# Patient Record
Sex: FEMALE | Race: WHITE | NOT HISPANIC OR LATINO | URBAN - METROPOLITAN AREA
[De-identification: names, ages, dates, MRNs, and addresses within clinical notes are randomized per-mention and may not be internally consistent; named-entity substitution may affect disease eponyms.]

---

## 2017-12-02 ENCOUNTER — EMERGENCY (EMERGENCY)
Facility: HOSPITAL | Age: 82
LOS: 1 days | Discharge: HOME | End: 2017-12-02
Attending: EMERGENCY MEDICINE | Admitting: EMERGENCY MEDICINE

## 2017-12-02 VITALS
TEMPERATURE: 98 F | DIASTOLIC BLOOD PRESSURE: 85 MMHG | RESPIRATION RATE: 18 BRPM | OXYGEN SATURATION: 100 % | SYSTOLIC BLOOD PRESSURE: 132 MMHG | HEART RATE: 92 BPM | WEIGHT: 104.94 LBS | HEIGHT: 59 IN

## 2017-12-02 NOTE — ED ADULT TRIAGE NOTE - CHIEF COMPLAINT QUOTE
Pt had glue placed to cover incision site after having a dermatologic procedure on Wednesday, today began having bleeding from incision site, pt on Eliquis

## 2017-12-02 NOTE — ED ADULT NURSE REASSESSMENT NOTE - NS ED NURSE REASSESS COMMENT FT1
Pt states her doctor called her back and told her to go to his office. States she does not want to be seen in ED

## 2017-12-06 DIAGNOSIS — L76.21 POSTPROCEDURAL HEMORRHAGE OF SKIN AND SUBCUTANEOUS TISSUE FOLLOWING A DERMATOLOGIC PROCEDURE: ICD-10-CM

## 2017-12-08 NOTE — ED POST DISCHARGE NOTE - OTHER COMMUNICATION
There is no contact information listed for the patient to check on her status and her reason for leaving the Er

## 2021-08-07 ENCOUNTER — TRANSCRIPTION ENCOUNTER (OUTPATIENT)
Age: 86
End: 2021-08-07

## 2023-08-21 PROBLEM — Z85.038 HISTORY OF MALIGNANT NEOPLASM OF COLON: Status: RESOLVED | Noted: 2023-08-21 | Resolved: 2023-08-21

## 2023-08-21 PROBLEM — Z00.00 ENCOUNTER FOR PREVENTIVE HEALTH EXAMINATION: Status: ACTIVE | Noted: 2023-08-21

## 2023-08-21 PROBLEM — M81.8 OTHER OSTEOPOROSIS: Status: ACTIVE | Noted: 2023-08-21

## 2023-08-21 RX ORDER — DAPAGLIFLOZIN 10 MG/1
10 TABLET, FILM COATED ORAL DAILY
Qty: 90 | Refills: 3 | Status: ACTIVE | COMMUNITY

## 2023-08-21 RX ORDER — DENOSUMAB 60 MG/ML
60 INJECTION SUBCUTANEOUS
Refills: 0 | Status: ACTIVE | COMMUNITY

## 2023-08-22 ENCOUNTER — APPOINTMENT (OUTPATIENT)
Dept: HEART AND VASCULAR | Facility: CLINIC | Age: 88
End: 2023-08-22
Payer: MEDICARE

## 2023-08-22 ENCOUNTER — APPOINTMENT (OUTPATIENT)
Dept: HEART AND VASCULAR | Facility: CLINIC | Age: 88
End: 2023-08-22

## 2023-08-22 VITALS
TEMPERATURE: 97.7 F | DIASTOLIC BLOOD PRESSURE: 77 MMHG | HEART RATE: 66 BPM | BODY MASS INDEX: 19.38 KG/M2 | HEIGHT: 56 IN | SYSTOLIC BLOOD PRESSURE: 129 MMHG | WEIGHT: 86.13 LBS | OXYGEN SATURATION: 94 %

## 2023-08-22 DIAGNOSIS — M81.8 OTHER OSTEOPOROSIS W/OUT CURRENT PATHOLOGICAL FRACTURE: ICD-10-CM

## 2023-08-22 DIAGNOSIS — Z63.4 DISAPPEARANCE AND DEATH OF FAMILY MEMBER: ICD-10-CM

## 2023-08-22 DIAGNOSIS — Z78.9 OTHER SPECIFIED HEALTH STATUS: ICD-10-CM

## 2023-08-22 DIAGNOSIS — Z85.038 PERSONAL HISTORY OF OTHER MALIGNANT NEOPLASM OF LARGE INTESTINE: ICD-10-CM

## 2023-08-22 PROCEDURE — 99205 OFFICE O/P NEW HI 60 MIN: CPT | Mod: 25

## 2023-08-22 PROCEDURE — 93000 ELECTROCARDIOGRAM COMPLETE: CPT

## 2023-08-22 RX ORDER — IRBESARTAN 150 MG/1
150 TABLET, FILM COATED ORAL
Refills: 0 | Status: ACTIVE | COMMUNITY

## 2023-08-22 SDOH — SOCIAL STABILITY - SOCIAL INSECURITY: DISSAPEARANCE AND DEATH OF FAMILY MEMBER: Z63.4

## 2023-08-22 NOTE — PHYSICAL EXAM
[No Acute Distress] : no acute distress [Normal Conjunctiva] : normal conjunctiva [Normal Venous Pressure] : normal venous pressure [No Carotid Bruit] : no carotid bruit [No Murmur] : no murmur [No Rub] : no rub [No Gallop] : no gallop [Clear Lung Fields] : clear lung fields [Good Air Entry] : good air entry [No Respiratory Distress] : no respiratory distress  [Soft] : abdomen soft [Non Tender] : non-tender [No Masses/organomegaly] : no masses/organomegaly [Normal Bowel Sounds] : normal bowel sounds [Normal Gait] : normal gait [No Cyanosis] : no cyanosis [No Clubbing] : no clubbing [No Varicosities] : no varicosities [Edema ___] : edema [unfilled] [No Rash] : no rash [No Skin Lesions] : no skin lesions [Moves all extremities] : moves all extremities [No Focal Deficits] : no focal deficits [Normal Speech] : normal speech [Alert and Oriented] : alert and oriented [Normal memory] : normal memory [Frail] : frail [de-identified] : + irregularly irregular  [de-identified] : uses a walker

## 2023-08-22 NOTE — REVIEW OF SYSTEMS
[Negative] : Heme/Lymph [Dyspnea on exertion] : dyspnea during exertion [Lower Ext Edema] : lower extremity edema [Joint Pain] : joint pain

## 2023-08-22 NOTE — ASSESSMENT
[FreeTextEntry1] : ======================================================================================= 1. Atrial fibrillation, persistent:  [id number] Score 5:       - continue systemic anticoagulation with Eliquis 2.5 mg po bid (age > 80, body weight < 60 kg)       - discussed with patient avoidance of ASA and NSAIDS as well as need for bleeding surveillance        - will follow with periodic echocardiograms to assess LV function (ordered today)   2. Chronic diastolic heart failure: NYHA II:       - continue Farxiga 10mg po qd  (discussed importance of urinary hygiene to prevent perineal infections)       - continue furosemide 40mg po qd prn       - will follow with serial echocardiograms (ordered today)   3. HTN: BP at ACC/AHA 2017 guideline target:       - continue irbesartan 150mg po qd       - continue metoprolol succinate 25mg po qd  4. Dyslipidemia:       - continue atorvastatin 10mg po qd      - discussed therapeutic lifestyle changes to promote improved lipid metabolism       - will check lab work next visit    I spent >45 minutes of total time on this visit, including time spent face-to-face and non-face-to-face.  During this time, I took a relevant history and examined the patient.  I reviewed relevant portions of the medical record and formulated a differential diagnosis and plan.  I explained the relevant cardiac diagnoses to the patient, as well as the work up and management plan.  I answered all questions related to the patient's cardiac conditions.

## 2023-08-22 NOTE — HISTORY OF PRESENT ILLNESS
[FreeTextEntry1] : Ms. Merino presents for initial evaluation and management of persistent atrial fibrillation, chronic diastolic heart failure, osteoporosis, chronic anemia, colon cancer (10/13/16), HTN, and dyslipidemia.  She was previously followed by another cardiologist, Daksha Nru MD (who moved).  Her  (who was my patient) passed away on 12/24/22 at the age of 89.  She is coping relatively well since his passing.  She is able to walk several blocks at a time without difficulty.  She has mild lower extremity edema and wears compression socks regularly.  She denies chest pain or palpitations.   1. I was told the name of the physician that took care of my child while in the hospital.    2. I have been told about any important findings on my child's physical exam and my child's plan of care.    3. The doctor clearly explained my child's diagnosis and other possible diagnoses that were considered.    4. My child's doctor explained all the tests that were done and their results (if available). I understand that some of the test results may not be ready before we go home and I was told how I can get these results. I understand that a summary of my child's hospitalization and important test results will be shared with my child's outpatient doctor.    5. My child's doctor talked to me about what I need to do when we go home.    6. I understand what signs and symptoms to watch for. I understand what symptoms I would need to call my doctor for and/or return to the hospital.    7. I have the phone number to call the hospital for results and/or questions after I leave the hospital.

## 2023-08-22 NOTE — REASON FOR VISIT
[Other: ____] : [unfilled] [FreeTextEntry1] : ======================================================================= Diagnostic Tests: -------------------------------------------------------------- EC23: atrial fibrillation with SVR, LAD, possible old anteroseptal MI.  23: atrial fibrillation with SVR, LAD, possible old anteroseptal MI.  --------------------------------------------------------------- Echo: 22: EF 70%, small LV, severe biatrial dilation, aortic sclerosis, mild AI, mild MR, moderate TR.   ---------------------------------------------------------------- Monitors: 2017: 48-hour Holter: HR , average 83bpm, APC's with aberrant conduction. ----------------------------------------------------------------- Stress tests:  16:  MPI: negative regadenoson ECG, adequate hemodynamic response, freq PVC's, normal perfusion (small mild anterior defect due to breast attenuation), EF 75%.

## 2023-09-22 RX ORDER — METOPROLOL SUCCINATE 25 MG/1
25 TABLET, EXTENDED RELEASE ORAL DAILY
Qty: 90 | Refills: 3 | Status: ACTIVE | COMMUNITY
Start: 1900-01-01 | End: 1900-01-01

## 2023-09-22 RX ORDER — APIXABAN 2.5 MG/1
2.5 TABLET, FILM COATED ORAL
Qty: 180 | Refills: 3 | Status: ACTIVE | COMMUNITY
Start: 1900-01-01 | End: 1900-01-01

## 2023-09-27 RX ORDER — ATORVASTATIN CALCIUM 10 MG/1
10 TABLET, FILM COATED ORAL DAILY
Qty: 90 | Refills: 3 | Status: ACTIVE | COMMUNITY
Start: 1900-01-01 | End: 1900-01-01

## 2023-10-09 ENCOUNTER — EMERGENCY (EMERGENCY)
Facility: HOSPITAL | Age: 88
LOS: 1 days | Discharge: HOME | End: 2023-10-09
Attending: EMERGENCY MEDICINE | Admitting: EMERGENCY MEDICINE
Payer: MEDICARE

## 2023-10-09 VITALS
DIASTOLIC BLOOD PRESSURE: 84 MMHG | RESPIRATION RATE: 16 BRPM | OXYGEN SATURATION: 96 % | TEMPERATURE: 98 F | SYSTOLIC BLOOD PRESSURE: 150 MMHG | HEART RATE: 68 BPM | WEIGHT: 89.07 LBS | HEIGHT: 66 IN

## 2023-10-09 VITALS
OXYGEN SATURATION: 96 % | SYSTOLIC BLOOD PRESSURE: 148 MMHG | RESPIRATION RATE: 16 BRPM | HEART RATE: 70 BPM | DIASTOLIC BLOOD PRESSURE: 84 MMHG

## 2023-10-09 DIAGNOSIS — Z79.01 LONG TERM (CURRENT) USE OF ANTICOAGULANTS: ICD-10-CM

## 2023-10-09 DIAGNOSIS — M79.662 PAIN IN LEFT LOWER LEG: ICD-10-CM
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
## 2023-10-09 DIAGNOSIS — I48.91 UNSPECIFIED ATRIAL FIBRILLATION: ICD-10-CM

## 2023-10-09 DIAGNOSIS — S80.12XA CONTUSION OF LEFT LOWER LEG, INITIAL ENCOUNTER: ICD-10-CM
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
## 2023-10-09 PROCEDURE — 73590 X-RAY EXAM OF LOWER LEG: CPT | Mod: 26,LT

## 2023-10-09 PROCEDURE — 99284 EMERGENCY DEPT VISIT MOD MDM: CPT

## 2023-10-09 NOTE — ED PROVIDER NOTE - CLINICAL SUMMARY MEDICAL DECISION MAKING FREE TEXT BOX
89 y/o F with Hx of A-fib on Eliquis presents for evaluation of contusion to left lower leg after possibly kicking the edge of her bed accidentally. On exam patient is afebrile. Vital signs stable. Patient has contusion to anterior aspect of left lower leg. No calf swelling or tenderness to suggest DVT. No signs of cellulitis on exam. Suspect isolated anterior hematoma. Plan for x-ray to evaluate for bony injury and reassess.

## 2023-10-09 NOTE — ED PROVIDER NOTE - PHYSICAL EXAMINATION
VITAL SIGNS: I have reviewed nursing notes and confirm.  CONST: No apparent distress.  ENT: No nasal discharge; airway clear.  EYES: Sclera clear. Pupils round and symmetrical bilaterally.  RESP: Breathing comfortably; speaking in full sentences.   MSK: +Tender contusion to anterior left lower leg, no calf swelling or TTP. No erythema or induration of skin around contusion.   NEURO: Alert, oriented. Speech is fluent and appropriate. Moving all extremities appropriately. No gross motor or sensory abnormalities.  SKIN: Skin is normal temperature; no diaphoresis; no pallor.  PSYCH: Cooperative. Appropriate mood, language, and behavior.

## 2023-10-09 NOTE — ED PROVIDER NOTE - OBJECTIVE STATEMENT
88 y/o F with Hx of A-fib on Eliquis presents for left lower leg bruise since yesterday. Patient's aide is at bedside who noticed the bruise this morning and brought her to the ED. Patient states she may have hit her leg against her bed during the weekend but unclear when. She reports she wears compression stockings daily.

## 2023-10-09 NOTE — ED PROVIDER NOTE - PROGRESS NOTE DETAILS
XRay neg for fx.  Pt ambulatory without limp.  LLE neurovascularly intact.  Compartments soft.  Discussed home care with pt including elevating leg above heart, ice, and compression.  Stable for dc.

## 2023-10-09 NOTE — ED PROVIDER NOTE - NSFOLLOWUPINSTRUCTIONS_ED_ALL_ED_FT
Apply ice to the area and keep the leg elevated above the level of the heart to help the bruise resolve as quickly as possible.        Contusion  A contusion is a deep bruise. Contusions are the result of a blunt injury to tissues and muscle fibers under the skin. The injury causes bleeding under the skin. The skin over the contusion may turn blue, purple, or yellow. Minor injuries will give you a painless contusion, but more severe injuries cause contusions that can stay painful and swollen for a few weeks.    Follow these instructions at home:  Pay attention to any changes in your symptoms. Let your health care provider know about them. Take these actions to relieve your pain.    Managing pain, stiffness, and swelling    Bag of ice on a towel on the skin.  Use resting, icing, applying pressure (compression), and raising (elevating) the injured area. This is often called the RICE method.  Rest the injured area. Return to your normal activities as told by your health care provider. Ask your health care provider what activities are safe for you.  If directed, put ice on the injured area. To do this:  Put ice in a plastic bag.  Place a towel between your skin and the bag.  Leave the ice on for 20 minutes, 2–3 times a day.  If your skin turns bright red, remove the ice right away to prevent skin damage. The risk of skin damage is higher if you cannot feel pain, heat, or cold.  If directed, apply light compression to the injured area using an elastic bandage. Make sure the bandage is not wrapped too tightly. Remove and reapply the bandage as directed by your health care provider.  If possible, elevate the injured area above the level of your heart while you are sitting or lying down.  General instructions    Take over-the-counter and prescription medicines only as told by your health care provider.  Keep all follow-up visits. Your health care provider may want to see how your contusion is healing with treatment.  Contact a health care provider if:  Your symptoms do not improve after several days of treatment.  Your symptoms get worse.  You have difficulty moving the injured area.  Get help right away if:  You have severe pain.  You have numbness in a hand or foot.  Your hand or foot turns pale or cold.  This information is not intended to replace advice given to you by your health care provider. Make sure you discuss any questions you have with your health care provider.

## 2023-10-09 NOTE — ED PROVIDER NOTE - PATIENT PORTAL LINK FT
You can access the FollowMyHealth Patient Portal offered by [facility] by registering at the following website: [email or web address]. By joining [employer]’s FollowMyHealth portal, you will also be able to view your health information using other applications (apps) compatible with our system.

## 2023-10-09 NOTE — ED ADULT NURSE NOTE - NSFALLUNIVINTERV_ED_ALL_ED
Bed/Stretcher in lowest position, wheels locked, appropriate side rails in place/Call bell, personal items and telephone in reach/Instruct patient to call for assistance before getting out of bed/chair/stretcher/Non-slip footwear applied when patient is off stretcher/[place] to call system/Physically safe environment - no spills, clutter or unnecessary equipment/Purposeful proactive rounding/Room/bathroom lighting operational, light cord in reach

## 2023-12-12 ENCOUNTER — INPATIENT (INPATIENT)
Facility: HOSPITAL | Age: 88
LOS: 1 days | Discharge: HOME | DRG: 291 | End: 2023-12-14
Attending: INTERNAL MEDICINE | Admitting: INTERNAL MEDICINE
Payer: COMMERCIAL

## 2023-12-12 ENCOUNTER — APPOINTMENT (OUTPATIENT)
Dept: HEART AND VASCULAR | Facility: CLINIC | Age: 88
End: 2023-12-12

## 2023-12-12 ENCOUNTER — NON-APPOINTMENT (OUTPATIENT)
Age: 88
End: 2023-12-12

## 2023-12-12 ENCOUNTER — EMERGENCY (EMERGENCY)
Facility: HOSPITAL | Age: 88
LOS: 1 days | Discharge: ANOTHER HOSPITAL | End: 2023-12-12
Attending: EMERGENCY MEDICINE | Admitting: EMERGENCY MEDICINE
Payer: MEDICARE

## 2023-12-12 VITALS
DIASTOLIC BLOOD PRESSURE: 74 MMHG | SYSTOLIC BLOOD PRESSURE: 158 MMHG | RESPIRATION RATE: 20 BRPM | TEMPERATURE: 98 F | HEART RATE: 79 BPM | HEIGHT: 55 IN | WEIGHT: 89.95 LBS | OXYGEN SATURATION: 97 %

## 2023-12-12 VITALS
SYSTOLIC BLOOD PRESSURE: 112 MMHG | OXYGEN SATURATION: 97 % | DIASTOLIC BLOOD PRESSURE: 64 MMHG | TEMPERATURE: 98 F | RESPIRATION RATE: 16 BRPM | HEART RATE: 58 BPM | HEIGHT: 55 IN

## 2023-12-12 VITALS
DIASTOLIC BLOOD PRESSURE: 54 MMHG | SYSTOLIC BLOOD PRESSURE: 90 MMHG | RESPIRATION RATE: 18 BRPM | OXYGEN SATURATION: 97 % | HEART RATE: 62 BPM

## 2023-12-12 DIAGNOSIS — I11.0 HYPERTENSIVE HEART DISEASE WITH HEART FAILURE: ICD-10-CM

## 2023-12-12 DIAGNOSIS — I50.33 ACUTE ON CHRONIC DIASTOLIC (CONGESTIVE) HEART FAILURE: ICD-10-CM

## 2023-12-12 DIAGNOSIS — C44.90 UNSPECIFIED MALIGNANT NEOPLASM OF SKIN, UNSPECIFIED: ICD-10-CM

## 2023-12-12 DIAGNOSIS — E78.5 HYPERLIPIDEMIA, UNSPECIFIED: ICD-10-CM

## 2023-12-12 DIAGNOSIS — Z79.01 LONG TERM (CURRENT) USE OF ANTICOAGULANTS: ICD-10-CM

## 2023-12-12 DIAGNOSIS — I48.91 UNSPECIFIED ATRIAL FIBRILLATION: ICD-10-CM

## 2023-12-12 DIAGNOSIS — R07.89 OTHER CHEST PAIN: ICD-10-CM

## 2023-12-12 DIAGNOSIS — Z90.49 ACQUIRED ABSENCE OF OTHER SPECIFIED PARTS OF DIGESTIVE TRACT: Chronic | ICD-10-CM

## 2023-12-12 DIAGNOSIS — I10 ESSENTIAL (PRIMARY) HYPERTENSION: ICD-10-CM

## 2023-12-12 DIAGNOSIS — I50.31 ACUTE DIASTOLIC (CONGESTIVE) HEART FAILURE: ICD-10-CM

## 2023-12-12 LAB
ALBUMIN SERPL ELPH-MCNC: 3.5 G/DL — SIGNIFICANT CHANGE UP (ref 3.4–5)
ALBUMIN SERPL ELPH-MCNC: 3.5 G/DL — SIGNIFICANT CHANGE UP (ref 3.4–5)
ALP SERPL-CCNC: 95 U/L — SIGNIFICANT CHANGE UP (ref 40–120)
ALP SERPL-CCNC: 95 U/L — SIGNIFICANT CHANGE UP (ref 40–120)
ALT FLD-CCNC: 29 U/L — SIGNIFICANT CHANGE UP (ref 12–42)
ALT FLD-CCNC: 29 U/L — SIGNIFICANT CHANGE UP (ref 12–42)
ANION GAP SERPL CALC-SCNC: 6 MMOL/L — LOW (ref 9–16)
ANION GAP SERPL CALC-SCNC: 6 MMOL/L — LOW (ref 9–16)
AST SERPL-CCNC: 31 U/L — SIGNIFICANT CHANGE UP (ref 15–37)
AST SERPL-CCNC: 31 U/L — SIGNIFICANT CHANGE UP (ref 15–37)
BASOPHILS # BLD AUTO: 0.02 K/UL — SIGNIFICANT CHANGE UP (ref 0–0.2)
BASOPHILS # BLD AUTO: 0.02 K/UL — SIGNIFICANT CHANGE UP (ref 0–0.2)
BASOPHILS NFR BLD AUTO: 0.3 % — SIGNIFICANT CHANGE UP (ref 0–2)
BASOPHILS NFR BLD AUTO: 0.3 % — SIGNIFICANT CHANGE UP (ref 0–2)
BILIRUB SERPL-MCNC: 0.8 MG/DL — SIGNIFICANT CHANGE UP (ref 0.2–1.2)
BILIRUB SERPL-MCNC: 0.8 MG/DL — SIGNIFICANT CHANGE UP (ref 0.2–1.2)
BUN SERPL-MCNC: 25 MG/DL — HIGH (ref 7–23)
BUN SERPL-MCNC: 25 MG/DL — HIGH (ref 7–23)
CALCIUM SERPL-MCNC: 9.3 MG/DL — SIGNIFICANT CHANGE UP (ref 8.5–10.5)
CALCIUM SERPL-MCNC: 9.3 MG/DL — SIGNIFICANT CHANGE UP (ref 8.5–10.5)
CHLORIDE SERPL-SCNC: 103 MMOL/L — SIGNIFICANT CHANGE UP (ref 96–108)
CHLORIDE SERPL-SCNC: 103 MMOL/L — SIGNIFICANT CHANGE UP (ref 96–108)
CK MB CFR SERPL CALC: 3.4 NG/ML — SIGNIFICANT CHANGE UP (ref 0–6.7)
CK MB CFR SERPL CALC: 3.4 NG/ML — SIGNIFICANT CHANGE UP (ref 0–6.7)
CK SERPL-CCNC: 49 U/L — SIGNIFICANT CHANGE UP (ref 25–170)
CK SERPL-CCNC: 49 U/L — SIGNIFICANT CHANGE UP (ref 25–170)
CO2 SERPL-SCNC: 31 MMOL/L — SIGNIFICANT CHANGE UP (ref 22–31)
CO2 SERPL-SCNC: 31 MMOL/L — SIGNIFICANT CHANGE UP (ref 22–31)
CREAT SERPL-MCNC: 0.76 MG/DL — SIGNIFICANT CHANGE UP (ref 0.5–1.3)
CREAT SERPL-MCNC: 0.76 MG/DL — SIGNIFICANT CHANGE UP (ref 0.5–1.3)
EGFR: 75 ML/MIN/1.73M2 — SIGNIFICANT CHANGE UP
EGFR: 75 ML/MIN/1.73M2 — SIGNIFICANT CHANGE UP
EOSINOPHIL # BLD AUTO: 0.03 K/UL — SIGNIFICANT CHANGE UP (ref 0–0.5)
EOSINOPHIL # BLD AUTO: 0.03 K/UL — SIGNIFICANT CHANGE UP (ref 0–0.5)
EOSINOPHIL NFR BLD AUTO: 0.4 % — SIGNIFICANT CHANGE UP (ref 0–6)
EOSINOPHIL NFR BLD AUTO: 0.4 % — SIGNIFICANT CHANGE UP (ref 0–6)
FLUAV AG NPH QL: SIGNIFICANT CHANGE UP
FLUAV AG NPH QL: SIGNIFICANT CHANGE UP
FLUBV AG NPH QL: SIGNIFICANT CHANGE UP
FLUBV AG NPH QL: SIGNIFICANT CHANGE UP
GLUCOSE SERPL-MCNC: 106 MG/DL — HIGH (ref 70–99)
GLUCOSE SERPL-MCNC: 106 MG/DL — HIGH (ref 70–99)
HCT VFR BLD CALC: 40.6 % — SIGNIFICANT CHANGE UP (ref 34.5–45)
HCT VFR BLD CALC: 40.6 % — SIGNIFICANT CHANGE UP (ref 34.5–45)
HGB BLD-MCNC: 13.3 G/DL — SIGNIFICANT CHANGE UP (ref 11.5–15.5)
HGB BLD-MCNC: 13.3 G/DL — SIGNIFICANT CHANGE UP (ref 11.5–15.5)
IMM GRANULOCYTES NFR BLD AUTO: 0.4 % — SIGNIFICANT CHANGE UP (ref 0–0.9)
IMM GRANULOCYTES NFR BLD AUTO: 0.4 % — SIGNIFICANT CHANGE UP (ref 0–0.9)
LACTATE BLDV-MCNC: 0.9 MMOL/L — SIGNIFICANT CHANGE UP (ref 0.5–2)
LACTATE BLDV-MCNC: 0.9 MMOL/L — SIGNIFICANT CHANGE UP (ref 0.5–2)
LYMPHOCYTES # BLD AUTO: 0.95 K/UL — LOW (ref 1–3.3)
LYMPHOCYTES # BLD AUTO: 0.95 K/UL — LOW (ref 1–3.3)
LYMPHOCYTES # BLD AUTO: 13.3 % — SIGNIFICANT CHANGE UP (ref 13–44)
LYMPHOCYTES # BLD AUTO: 13.3 % — SIGNIFICANT CHANGE UP (ref 13–44)
MCHC RBC-ENTMCNC: 30.9 PG — SIGNIFICANT CHANGE UP (ref 27–34)
MCHC RBC-ENTMCNC: 30.9 PG — SIGNIFICANT CHANGE UP (ref 27–34)
MCHC RBC-ENTMCNC: 32.8 GM/DL — SIGNIFICANT CHANGE UP (ref 32–36)
MCHC RBC-ENTMCNC: 32.8 GM/DL — SIGNIFICANT CHANGE UP (ref 32–36)
MCV RBC AUTO: 94.2 FL — SIGNIFICANT CHANGE UP (ref 80–100)
MCV RBC AUTO: 94.2 FL — SIGNIFICANT CHANGE UP (ref 80–100)
MONOCYTES # BLD AUTO: 0.53 K/UL — SIGNIFICANT CHANGE UP (ref 0–0.9)
MONOCYTES # BLD AUTO: 0.53 K/UL — SIGNIFICANT CHANGE UP (ref 0–0.9)
MONOCYTES NFR BLD AUTO: 7.4 % — SIGNIFICANT CHANGE UP (ref 2–14)
MONOCYTES NFR BLD AUTO: 7.4 % — SIGNIFICANT CHANGE UP (ref 2–14)
NEUTROPHILS # BLD AUTO: 5.6 K/UL — SIGNIFICANT CHANGE UP (ref 1.8–7.4)
NEUTROPHILS # BLD AUTO: 5.6 K/UL — SIGNIFICANT CHANGE UP (ref 1.8–7.4)
NEUTROPHILS NFR BLD AUTO: 78.2 % — HIGH (ref 43–77)
NEUTROPHILS NFR BLD AUTO: 78.2 % — HIGH (ref 43–77)
NRBC # BLD: 0 /100 WBCS — SIGNIFICANT CHANGE UP (ref 0–0)
NRBC # BLD: 0 /100 WBCS — SIGNIFICANT CHANGE UP (ref 0–0)
NT-PROBNP SERPL-SCNC: 2480 PG/ML — HIGH
NT-PROBNP SERPL-SCNC: 2480 PG/ML — HIGH
PCO2 BLDV: 44 MMHG — HIGH (ref 39–42)
PCO2 BLDV: 44 MMHG — HIGH (ref 39–42)
PH BLDV: 7.43 — SIGNIFICANT CHANGE UP (ref 7.32–7.43)
PH BLDV: 7.43 — SIGNIFICANT CHANGE UP (ref 7.32–7.43)
PLATELET # BLD AUTO: 183 K/UL — SIGNIFICANT CHANGE UP (ref 150–400)
PLATELET # BLD AUTO: 183 K/UL — SIGNIFICANT CHANGE UP (ref 150–400)
PO2 BLDV: 45 MMHG — SIGNIFICANT CHANGE UP (ref 25–45)
PO2 BLDV: 45 MMHG — SIGNIFICANT CHANGE UP (ref 25–45)
POTASSIUM SERPL-MCNC: 3.8 MMOL/L — SIGNIFICANT CHANGE UP (ref 3.5–5.3)
POTASSIUM SERPL-MCNC: 3.8 MMOL/L — SIGNIFICANT CHANGE UP (ref 3.5–5.3)
POTASSIUM SERPL-SCNC: 3.8 MMOL/L — SIGNIFICANT CHANGE UP (ref 3.5–5.3)
POTASSIUM SERPL-SCNC: 3.8 MMOL/L — SIGNIFICANT CHANGE UP (ref 3.5–5.3)
PROT SERPL-MCNC: 7.1 G/DL — SIGNIFICANT CHANGE UP (ref 6.4–8.2)
PROT SERPL-MCNC: 7.1 G/DL — SIGNIFICANT CHANGE UP (ref 6.4–8.2)
RBC # BLD: 4.31 M/UL — SIGNIFICANT CHANGE UP (ref 3.8–5.2)
RBC # BLD: 4.31 M/UL — SIGNIFICANT CHANGE UP (ref 3.8–5.2)
RBC # FLD: 14.2 % — SIGNIFICANT CHANGE UP (ref 10.3–14.5)
RBC # FLD: 14.2 % — SIGNIFICANT CHANGE UP (ref 10.3–14.5)
RSV RNA NPH QL NAA+NON-PROBE: SIGNIFICANT CHANGE UP
RSV RNA NPH QL NAA+NON-PROBE: SIGNIFICANT CHANGE UP
SAO2 % BLDV: 70.1 % — SIGNIFICANT CHANGE UP (ref 67–88)
SAO2 % BLDV: 70.1 % — SIGNIFICANT CHANGE UP (ref 67–88)
SARS-COV-2 RNA SPEC QL NAA+PROBE: SIGNIFICANT CHANGE UP
SARS-COV-2 RNA SPEC QL NAA+PROBE: SIGNIFICANT CHANGE UP
SODIUM SERPL-SCNC: 140 MMOL/L — SIGNIFICANT CHANGE UP (ref 132–145)
SODIUM SERPL-SCNC: 140 MMOL/L — SIGNIFICANT CHANGE UP (ref 132–145)
TROPONIN I, HIGH SENSITIVITY RESULT: 12.4 NG/L — SIGNIFICANT CHANGE UP
TROPONIN I, HIGH SENSITIVITY RESULT: 12.4 NG/L — SIGNIFICANT CHANGE UP
TROPONIN I, HIGH SENSITIVITY RESULT: 15 NG/L — SIGNIFICANT CHANGE UP
TROPONIN I, HIGH SENSITIVITY RESULT: 15 NG/L — SIGNIFICANT CHANGE UP
TROPONIN T, HIGH SENSITIVITY RESULT: 32 NG/L — SIGNIFICANT CHANGE UP (ref 0–51)
TROPONIN T, HIGH SENSITIVITY RESULT: 32 NG/L — SIGNIFICANT CHANGE UP (ref 0–51)
WBC # BLD: 7.16 K/UL — SIGNIFICANT CHANGE UP (ref 3.8–10.5)
WBC # BLD: 7.16 K/UL — SIGNIFICANT CHANGE UP (ref 3.8–10.5)
WBC # FLD AUTO: 7.16 K/UL — SIGNIFICANT CHANGE UP (ref 3.8–10.5)
WBC # FLD AUTO: 7.16 K/UL — SIGNIFICANT CHANGE UP (ref 3.8–10.5)

## 2023-12-12 PROCEDURE — 99223 1ST HOSP IP/OBS HIGH 75: CPT

## 2023-12-12 PROCEDURE — 99285 EMERGENCY DEPT VISIT HI MDM: CPT

## 2023-12-12 PROCEDURE — 71045 X-RAY EXAM CHEST 1 VIEW: CPT | Mod: 26

## 2023-12-12 RX ORDER — LOSARTAN POTASSIUM 100 MG/1
25 TABLET, FILM COATED ORAL DAILY
Refills: 0 | Status: DISCONTINUED | OUTPATIENT
Start: 2023-12-13 | End: 2023-12-14

## 2023-12-12 RX ORDER — FUROSEMIDE 40 MG
40 TABLET ORAL
Refills: 0 | Status: DISCONTINUED | OUTPATIENT
Start: 2023-12-13 | End: 2023-12-14

## 2023-12-12 RX ORDER — DAPAGLIFLOZIN 10 MG/1
1 TABLET, FILM COATED ORAL
Refills: 0 | DISCHARGE

## 2023-12-12 RX ORDER — ATORVASTATIN CALCIUM 80 MG/1
1 TABLET, FILM COATED ORAL
Refills: 0 | DISCHARGE

## 2023-12-12 RX ORDER — CALCIUM CARBONATE 500(1250)
2 TABLET ORAL
Refills: 0 | DISCHARGE

## 2023-12-12 RX ORDER — IRBESARTAN 75 MG/1
1 TABLET ORAL
Refills: 0 | DISCHARGE

## 2023-12-12 RX ORDER — APIXABAN 2.5 MG/1
2.5 TABLET, FILM COATED ORAL
Refills: 0 | Status: DISCONTINUED | OUTPATIENT
Start: 2023-12-13 | End: 2023-12-14

## 2023-12-12 RX ORDER — METOPROLOL TARTRATE 50 MG
25 TABLET ORAL DAILY
Refills: 0 | Status: DISCONTINUED | OUTPATIENT
Start: 2023-12-13 | End: 2023-12-14

## 2023-12-12 RX ORDER — APIXABAN 2.5 MG/1
2.5 TABLET, FILM COATED ORAL
Refills: 0 | Status: DISCONTINUED | OUTPATIENT
Start: 2023-12-12 | End: 2023-12-12

## 2023-12-12 RX ORDER — APIXABAN 2.5 MG/1
1 TABLET, FILM COATED ORAL
Refills: 0 | DISCHARGE

## 2023-12-12 RX ORDER — CALCIUM CARBONATE 500(1250)
1 TABLET ORAL DAILY
Refills: 0 | Status: DISCONTINUED | OUTPATIENT
Start: 2023-12-13 | End: 2023-12-14

## 2023-12-12 RX ORDER — DAPAGLIFLOZIN 10 MG/1
10 TABLET, FILM COATED ORAL DAILY
Refills: 0 | Status: DISCONTINUED | OUTPATIENT
Start: 2023-12-13 | End: 2023-12-14

## 2023-12-12 RX ORDER — NITROGLYCERIN 6.5 MG
0.4 CAPSULE, EXTENDED RELEASE ORAL ONCE
Refills: 0 | Status: COMPLETED | OUTPATIENT
Start: 2023-12-12 | End: 2023-12-12

## 2023-12-12 RX ORDER — METOPROLOL TARTRATE 50 MG
1 TABLET ORAL
Refills: 0 | DISCHARGE

## 2023-12-12 RX ORDER — CHOLECALCIFEROL (VITAMIN D3) 125 MCG
1 CAPSULE ORAL
Refills: 0 | DISCHARGE

## 2023-12-12 RX ORDER — FUROSEMIDE 40 MG
60 TABLET ORAL ONCE
Refills: 0 | Status: COMPLETED | OUTPATIENT
Start: 2023-12-12 | End: 2023-12-12

## 2023-12-12 RX ORDER — ATORVASTATIN CALCIUM 80 MG/1
10 TABLET, FILM COATED ORAL AT BEDTIME
Refills: 0 | Status: DISCONTINUED | OUTPATIENT
Start: 2023-12-12 | End: 2023-12-14

## 2023-12-12 RX ORDER — CHOLECALCIFEROL (VITAMIN D3) 125 MCG
2000 CAPSULE ORAL
Refills: 0 | Status: DISCONTINUED | OUTPATIENT
Start: 2023-12-12 | End: 2023-12-14

## 2023-12-12 RX ORDER — FUROSEMIDE 40 MG
20 TABLET ORAL ONCE
Refills: 0 | Status: COMPLETED | OUTPATIENT
Start: 2023-12-12 | End: 2023-12-12

## 2023-12-12 RX ADMIN — ATORVASTATIN CALCIUM 10 MILLIGRAM(S): 80 TABLET, FILM COATED ORAL at 23:48

## 2023-12-12 RX ADMIN — Medication 20 MILLIGRAM(S): at 23:48

## 2023-12-12 RX ADMIN — Medication 0.4 MILLIGRAM(S): at 06:42

## 2023-12-12 RX ADMIN — Medication 60 MILLIGRAM(S): at 06:45

## 2023-12-12 NOTE — H&P ADULT - HISTORY OF PRESENT ILLNESS
INCOMPLETE    88 yo F with PMHx of A-fib on Eliquis, CHF, unknown EF, skin CA, walked in accompanied by family member c/o SOB and chest pressure x 2d. Pt reports having increased SOB/WOB in the past two days with chest pressure and pain since this morning. Has been taking all her home meds as directed and has appt with her Cardiologist Dr. Finkelstein at 2pm today for outpt TTE. Denies fever, chills, palpitations, diaphoresis, cough, hemoptysis, wheezing, worsening peripheral edema, focal weakness, numbness, tingling, paresthesia, HA, dizziness, neck pain, N/V/D/C, abdominal pain, change in urinary/bowel function, trauma, fall, rash, and malaise. Generally ambulates with a rolling walker.     At [facility], /74 mmHg, HR 79, T 97.5 F, SpO2 97% on RA.   Labs significant for Trop I 12.4 > 15, BNP 2480, RVP negative.   EKG rate controlled afib, non ischemic. CXR w mild pulm vasc congestion, R sided effusion, cardiomegaly.  Upon reassessment, pt noted to be hypoxic to 92% on RA, given lasix 60mg IV x1, sl NTG x1 and placed on BIPAP for inc WOB. Subsequently waned to 2L NC.   En route to [facility], per EMS pt in afib rate 45-60 w BP 92/51, pt divered to ED for initial assessment.     On arrival to [facility] ED, VS /64, HR afib 66bpm, SpO2 94% on RA, RR 18, T 97.6 F. Pt asymptoamtic, ___CP free. Pt admitted to cardiac tele for further management of ____.             4'6"  89.9 lbs INCOMPLETE    89 yo F with PMHx of A-fib on Eliquis, CHF, unknown EF, skin CA, walked in accompanied by family member c/o SOB and chest pressure x 2d. Pt reports having increased SOB/WOB in the past two days with chest pressure and pain since this morning. Has been taking all her home meds as directed and has appt with her Cardiologist Dr. Finkelstein at 2pm today for outpt TTE. Denies fever, chills, palpitations, diaphoresis, cough, hemoptysis, wheezing, worsening peripheral edema, focal weakness, numbness, tingling, paresthesia, HA, dizziness, neck pain, N/V/D/C, abdominal pain, change in urinary/bowel function, trauma, fall, rash, and malaise. Generally ambulates with a rolling walker.     At [facility], /74 mmHg, HR 79, T 97.5 F, SpO2 97% on RA.   Labs significant for Trop I 12.4 > 15, BNP 2480, RVP negative.   EKG rate controlled afib, non ischemic. CXR w mild pulm vasc congestion, R sided effusion, cardiomegaly.  Upon reassessment, pt noted to be hypoxic to 92% on RA, given lasix 60mg IV x1, sl NTG x1 and placed on BIPAP for inc WOB. Subsequently waned to 2L NC.   En route to [facility], per EMS pt in afib rate 45-60 w BP 92/51, pt divered to ED for initial assessment.     On arrival to [facility] ED, VS /64, HR afib 66bpm, SpO2 94% on RA, RR 18, T 97.6 F. Pt asymptoamtic, ___CP free. Pt admitted to cardiac tele for further management of ____.             4'6"  89.9 lbs INCOMPLETE    88 yo F with PMHx of [place] (on Eliquis), HFpEF, HTN, HLD, osteoporosis, anemia, colon cancer (10/13/16 s/p partial colectomy), and skin CA who presented to [facility] complaining of worsening SOB x2 days with new chest pressure onset this morning described as ___. She endorses compliance with all her medications. Pt had an appt with her cardiologist Dr. Finkelstein today for outpt TTE however entered the ED 2/2 worsening sx. She Denies fever, chills, palpitations, diaphoresis, cough, hemoptysis, wheezing, worsening peripheral edema, focal weakness, numbness, tingling, paresthesia, HA, dizziness, neck pain, N/V/D/C, abdominal pain, change in urinary/bowel function, trauma, fall, rash, and malaise. Generally ambulates with a rolling walker.     [facility] course:  Vital signs: /74 mmHg, HR 79, T 97.5 F, SpO2 97% on RA.   Labs significant for Trop I 12.4 > 15, BNP 2480, RVP negative.   EKG rate controlled afib, non ischemic. CXR w mild pulm vasc congestion, R sided effusion, cardiomegaly.  --Upon reassessment, pt noted to be hypoxic to 92% on RA, given Lasix 60mg IV x1, sl NTG x1 and placed on BIPAP for inc WOB. Subsequently weaned to 2L NC.     En route to [facility], per EMS pt in afib rate 45-60 w BP 92/51, pt diverted to ED for initial assessment.     On arrival to [facility] ED, VS /64, HR afib 66bpm, SpO2 94% on RA, RR 18, T 97.6 F. Pt asymptomatic ___CP free. Pt admitted to cardiac tele for further management of HFpEF exacerbation.  INCOMPLETE    89 yo F with PMHx of [place] (on Eliquis), HFpEF, HTN, HLD, osteoporosis, anemia, colon cancer (10/13/16 s/p partial colectomy), and skin CA who presented to [facility] complaining of worsening SOB x2 days with new chest pressure onset this morning described as ___. She endorses compliance with all her medications. Pt had an appt with her cardiologist Dr. Finkelstein today for outpt TTE however entered the ED 2/2 worsening sx. She Denies fever, chills, palpitations, diaphoresis, cough, hemoptysis, wheezing, worsening peripheral edema, focal weakness, numbness, tingling, paresthesia, HA, dizziness, neck pain, N/V/D/C, abdominal pain, change in urinary/bowel function, trauma, fall, rash, and malaise. Generally ambulates with a rolling walker.     [facility] course:  Vital signs: /74 mmHg, HR 79, T 97.5 F, SpO2 97% on RA.   Labs significant for Trop I 12.4 > 15, BNP 2480, RVP negative.   EKG rate controlled afib, non ischemic. CXR w mild pulm vasc congestion, R sided effusion, cardiomegaly.  --Upon reassessment, pt noted to be hypoxic to 92% on RA, given Lasix 60mg IV x1, sl NTG x1 and placed on BIPAP for inc WOB. Subsequently weaned to 2L NC.     En route to [facility], per EMS pt in afib rate 45-60 w BP 92/51, pt diverted to ED for initial assessment.     On arrival to [facility] ED, VS /64, HR afib 66bpm, SpO2 94% on RA, RR 18, T 97.6 F. Pt asymptomatic ___CP free. Pt admitted to cardiac tele for further management of HFpEF exacerbation.  89 yo F with PMHx of A.fib (on Eliquis), HFpEF, HTN, HLD, osteoporosis, anemia, colon cancer (s/p partial colectomy 10/13/16 ), and squamous cell skin carcinoma s/p multiple Moh's procedures who presented to [facility] accompanied by home caretaker complaining of worsening SOB/DOEx2 days with new localized substernal chest pressure onset this morning described as a transient intermittent sharp pain that resolves on its own. She further admits to chronic b/l LE edema at baseline. Pt had an appt with her cardiologist Dr. Finkelstein today for outpt TTE however entered the ED 2/2 worsening sx.  She endorses compliance with all her medications and denies any prior similar episodes, however does note that she was hospitalized 1 year ago for RSV and was discharged on home O2 but returned it a few months afterwards as she didn't require it. Denies any associated orthopnea, PND, palpitations, cough, diaphoresis, fever, chills, n/v/d, sick contacts, abd pain. Generally ambulates with a rolling walker.     [facility] course:  Vital signs: /74 mmHg, HR 79, T 97.5 F, SpO2 97% on RA.   Labs significant for Trop I 12.4 > 15, BNP 2480, RVP negative.   EKG rate controlled afib non ischemic. CXR w mild pulm vasc congestion, R sided effusion, cardiomegaly.  --Upon reassessment, pt noted to be hypoxic to 92% on RA, given Lasix 60mg IV x1, sl NTG x1 and placed on BIPAP for inc WOB. Subsequently weaned to 2L NC.     En route to [facility], per EMS pt in afib rate 45-60 w BP 92/51 thus pt diverted to ED for initial assessment.     On arrival to [facility] ED, VS /64, HR a.fib 66bpm, SpO2 94% on RA, RR 18, T 97.6 F. Trop T 32, CK/CKMB normal. Pt asymptomatic, SOB has somewhat improved, CP free. EKG remains rate controlled afib, non ischemic. Pt admitted to cardiac tele for further management of HFpEF exacerbation.  88 yo F with PMHx of A.fib (on Eliquis), HFpEF, HTN, HLD, osteoporosis, anemia, colon cancer (s/p partial colectomy 10/13/16 ), and squamous cell skin carcinoma s/p multiple Moh's procedures who presented to [facility] accompanied by home caretaker complaining of worsening SOB/DOEx2 days with new localized substernal chest pressure onset this morning described as a transient intermittent sharp pain that resolves on its own. She further admits to chronic b/l LE edema at baseline. Pt had an appt with her cardiologist Dr. Finkelstein today for outpt TTE however entered the ED 2/2 worsening sx.  She endorses compliance with all her medications and denies any prior similar episodes, however does note that she was hospitalized 1 year ago for RSV and was discharged on home O2 but returned it a few months afterwards as she didn't require it. Denies any associated orthopnea, PND, palpitations, cough, diaphoresis, fever, chills, n/v/d, sick contacts, abd pain. Generally ambulates with a rolling walker.     [facility] course:  Vital signs: /74 mmHg, HR 79, T 97.5 F, SpO2 97% on RA.   Labs significant for Trop I 12.4 > 15, BNP 2480, RVP negative.   EKG rate controlled afib non ischemic. CXR w mild pulm vasc congestion, R sided effusion, cardiomegaly.  --Upon reassessment, pt noted to be hypoxic to 92% on RA, given Lasix 60mg IV x1, sl NTG x1 and placed on BIPAP for inc WOB. Subsequently weaned to 2L NC.     En route to [facility], per EMS pt in afib rate 45-60 w BP 92/51 thus pt diverted to ED for initial assessment.     On arrival to [facility] ED, VS /64, HR a.fib 66bpm, SpO2 94% on RA, RR 18, T 97.6 F. Trop T 32, CK/CKMB normal. Pt asymptomatic, SOB has somewhat improved, CP free. EKG remains rate controlled afib, non ischemic. Pt admitted to cardiac tele for further management of HFpEF exacerbation.  88 yo F with PMHx of A.fib (on Eliquis), HFpEF, HTN, HLD, osteoporosis, anemia, colon cancer (s/p partial colectomy 10/13/16 ), and SCC s/p multiple Moh's procedures who presented to [facility] accompanied by home caretaker complaining of worsening SOB/DOEx2 days with new localized substernal chest pressure onset this morning described as a transient intermittent sharp pain that resolves on its own. She further admits to chronic b/l LE edema at baseline. Pt had an appt with her cardiologist Dr. Finkelstein today for outpt TTE however entered the ED 2/2 worsening sx.  She endorses compliance with all her medications and denies any prior similar episodes, however does note that she was hospitalized 1 year ago for RSV and was discharged on home O2 but returned it a few months afterwards as she didn't require it. Denies any associated orthopnea, PND, palpitations, cough, diaphoresis, fever, chills, n/v/d, sick contacts, abd pain. Generally ambulates with a rolling walker.     [facility] course:  Vital signs: /74 mmHg, HR 79, T 97.5 F, SpO2 97% on RA.   Labs significant for Trop I 12.4 > 15, BNP 2480, RVP negative.   EKG rate controlled afib non ischemic. CXR w mild pulm vasc congestion, R sided effusion, cardiomegaly.  --Upon reassessment, pt noted to be hypoxic to 92% on RA, given Lasix 60mg IV x1, sl NTG x1 and placed on BIPAP for inc WOB. Subsequently weaned to 2L NC.     En route to [facility], per EMS pt in afib rate 45-60 w BP 92/51 thus pt diverted to ED for initial assessment.     On arrival to [facility] ED, VS /64, HR a.fib 66bpm, SpO2 94% on RA, RR 18, T 97.6 F. Trop T 32, CK/CKMB normal. Pt asymptomatic, SOB has somewhat improved, CP free. EKG remains rate controlled afib, non ischemic. Pt admitted to cardiac tele for further management of HFpEF exacerbation.  89 yo F with PMHx of A.fib (on Eliquis), HFpEF, HTN, HLD, osteoporosis, anemia, colon cancer (s/p partial colectomy 10/13/16 ), and SCC s/p multiple Moh's procedures who presented to [facility] accompanied by home caretaker complaining of worsening SOB/DOEx2 days with new localized substernal chest pressure onset this morning described as a transient intermittent sharp pain that resolves on its own. She further admits to chronic b/l LE edema at baseline. Pt had an appt with her cardiologist Dr. Finkelstein today for outpt TTE however entered the ED 2/2 worsening sx.  She endorses compliance with all her medications and denies any prior similar episodes, however does note that she was hospitalized 1 year ago for RSV and was discharged on home O2 but returned it a few months afterwards as she didn't require it. Denies any associated orthopnea, PND, palpitations, cough, diaphoresis, fever, chills, n/v/d, sick contacts, abd pain. Generally ambulates with a rolling walker.     [facility] course:  Vital signs: /74 mmHg, HR 79, T 97.5 F, SpO2 97% on RA.   Labs significant for Trop I 12.4 > 15, BNP 2480, RVP negative.   EKG rate controlled afib non ischemic. CXR w mild pulm vasc congestion, R sided effusion, cardiomegaly.  --Upon reassessment, pt noted to be hypoxic to 92% on RA, given Lasix 60mg IV x1, sl NTG x1 and placed on BIPAP for inc WOB. Subsequently weaned to 2L NC.     En route to [facility], per EMS pt in afib rate 45-60 w BP 92/51 thus pt diverted to ED for initial assessment.     On arrival to [facility] ED, VS /64, HR a.fib 66bpm, SpO2 94% on RA, RR 18, T 97.6 F. Trop T 32, CK/CKMB normal. Pt asymptomatic, SOB has somewhat improved, CP free. EKG remains rate controlled afib, non ischemic. Pt admitted to cardiac tele for further management of HFpEF exacerbation.  88 yo F, former smoker, with PMHx of A.fib (on Eliquis), HFpEF, HTN, HLD, osteoporosis, anemia, colon cancer (s/p partial colectomy 10/13/16 ), and SCC s/p multiple Moh's procedures who presented to [facility] accompanied by home caretaker complaining of worsening SOB/DOEx2 days with new localized substernal chest pressure onset this morning described as a transient intermittent sharp pain that resolves on its own. She further admits to chronic b/l LE edema at baseline. Pt had an appt with her cardiologist Dr. Finkelstein today for outpt TTE however entered the ED 2/2 worsening sx.  She endorses compliance with all her medications and denies any prior similar episodes, however does note that she was hospitalized 1 year ago for RSV and was discharged on home O2 but returned it a few months afterwards as she didn't require it. Denies any associated orthopnea, PND, palpitations, cough, diaphoresis, fever, chills, n/v/d, sick contacts, abd pain. Generally ambulates with a rolling walker.     [facility] course:  Vital signs: /74 mmHg, HR 79, T 97.5 F, SpO2 97% on RA.   Labs significant for Trop I 12.4 > 15, BNP 2480, RVP negative.   EKG rate controlled afib non ischemic. CXR w mild pulm vasc congestion, R sided effusion, cardiomegaly.  --Upon reassessment, pt noted to be hypoxic to 92% on RA, given Lasix 60mg IV x1, sl NTG x1 and placed on BIPAP for inc WOB. Subsequently weaned to 2L NC.     En route to [facility], per EMS pt in afib rate 45-60 w BP 92/51 thus pt diverted to ED for initial assessment.     On arrival to [facility] ED, VS /64, HR a.fib 66bpm, SpO2 94% on RA, RR 18, T 97.6 F. Trop T 32, CK/CKMB normal. Pt asymptomatic, SOB has somewhat improved, CP free. EKG remains rate controlled afib, non ischemic. Pt admitted to cardiac tele for further management of HFpEF exacerbation.      89 yo F, former smoker, with PMHx of A.fib (on Eliquis), HFpEF, HTN, HLD, osteoporosis, anemia, colon cancer (s/p partial colectomy 10/13/16 ), and SCC s/p multiple Moh's procedures who presented to [facility] accompanied by home caretaker complaining of worsening SOB/DOEx2 days with new localized substernal chest pressure onset this morning described as a transient intermittent sharp pain that resolves on its own. She further admits to chronic b/l LE edema at baseline. Pt had an appt with her cardiologist Dr. Finkelstein today for outpt TTE however entered the ED 2/2 worsening sx.  She endorses compliance with all her medications and denies any prior similar episodes, however does note that she was hospitalized 1 year ago for RSV and was discharged on home O2 but returned it a few months afterwards as she didn't require it. Denies any associated orthopnea, PND, palpitations, cough, diaphoresis, fever, chills, n/v/d, sick contacts, abd pain. Generally ambulates with a rolling walker.     [facility] course:  Vital signs: /74 mmHg, HR 79, T 97.5 F, SpO2 97% on RA.   Labs significant for Trop I 12.4 > 15, BNP 2480, RVP negative.   EKG rate controlled afib non ischemic. CXR w mild pulm vasc congestion, R sided effusion, cardiomegaly.  --Upon reassessment, pt noted to be hypoxic to 92% on RA, given Lasix 60mg IV x1, sl NTG x1 and placed on BIPAP for inc WOB. Subsequently weaned to 2L NC.     En route to [facility], per EMS pt in afib rate 45-60 w BP 92/51 thus pt diverted to ED for initial assessment.     On arrival to [facility] ED, VS /64, HR a.fib 66bpm, SpO2 94% on RA, RR 18, T 97.6 F. Trop T 32, CK/CKMB normal. Pt asymptomatic, SOB has somewhat improved, CP free. EKG remains rate controlled afib, non ischemic. Pt admitted to cardiac tele for further management of HFpEF exacerbation.

## 2023-12-12 NOTE — ED PROVIDER NOTE - CLINICAL SUMMARY MEDICAL DECISION MAKING FREE TEXT BOX
88 yo F with PMHx of A-fib on Eliquis, CHF, unknown EF, skin CA, walked in accompanied by family member c/o SOB and increased WOB x 2 days, now with chest pressure and pain since this morning. EKG non ischemic appearing, baseline A.fib w rate controlled. Noted hypoxic to 92% on RA with minimal exertion. CXR with mild pulmonary vascular congestion and R sided effusion, afebrile, placed on bipap for WOB, SL NTG, additional diuretic ordered, r/o infectious and ischemic etiology. Will keep pt in obs for CHF exacerbation/cardiac monitoring and serial labs/tele monitoring. Pt has appt with private cardiologist Dr. Montana at 1:45pm today, will likely reach out to Dr. Montana for prompt and safe dispo later today 89 yo F with PMHx of A-fib on Eliquis, CHF, unknown EF, skin CA, walked in accompanied by family member c/o SOB and increased WOB x 2 days, now with chest pressure and pain since this morning. EKG non ischemic appearing, baseline A.fib w rate controlled. Noted hypoxic to 92% on RA with minimal exertion. CXR with mild pulmonary vascular congestion and R sided effusion, afebrile, placed on bipap for WOB, SL NTG, additional diuretic ordered, r/o infectious and ischemic etiology. Will keep pt in obs for CHF exacerbation/cardiac monitoring and serial labs/tele monitoring. Pt has appt with private cardiologist Dr. Montana at 1:45pm today, will likely reach out to Dr. Montana for prompt and safe dispo later today

## 2023-12-12 NOTE — ED CDU PROVIDER INITIAL DAY NOTE - PROGRESS NOTE DETAILS
tolerating bipap, decreased WOB at bedside and appears more comfortable. s/p IV lasix and SL NTG with good urine outpt. strict I&O and cardiac monitoring in place. Will repeat trop in 3 hours and reach out to pt's private cardiologist - Dr. Montana [phone] for close fu. Pt is scheduled for outpt TTE at 2pm today. Will sign out to day team pending repeat trop, reassessment, and appropriate dispo tolerating bipap, decreased WOB at bedside and appears more comfortable. s/p IV lasix and SL NTG with good urine outpt. strict I&O and cardiac monitoring in place. Will repeat trop in 3 hours and reach out to pt's private cardiologist - Dr. Montana [phone] for close fu. Pt is scheduled for outpt TTE at 2pm today. Will sign out to day team pending repeat trop, reassessment, and appropriate dispo

## 2023-12-12 NOTE — ED ADULT NURSE REASSESSMENT NOTE - NS ED NURSE REASSESS COMMENT FT1
Walk trial performed with pulse oximetry. SpO2 dropped to 85% whilst walking with intermittent chest pain. Pt brought back to bed and placed on cardiac monitor and pulse oximetry. NC at 2 lpm maintained and SpO2 97% while in bed. Walk trial performed with pulse oximetry. SpO2 dropped to 85% whilst walking with intermittent chest pain. Pt brought back to bed and placed on cardiac monitor and pulse oximetry. NC at 2 lpm maintained and SpO2 97% while in bed. JAM Daniels made aware.

## 2023-12-12 NOTE — ED ADULT NURSE NOTE - OBJECTIVE STATEMENT
88 yo F presents to ED from [facility] w/ CHf exacerbation, however HR was 48 so she was sent to the ED. Pt denies symptoms or pain. Alert and oriented, ambulates with walker, on blood thinners, denies recent falls. Pt is on 2L O2 NC, RA at baseline. Pt has hooks catheter that was placed somewhere else, but there is no bag attached to it. 89 yo F presents to ED from [facility] w/ CHf exacerbation, however HR was 48 so she was sent to the ED. Pt denies symptoms or pain. Alert and oriented, ambulates with walker, on blood thinners, denies recent falls. Pt is on 2L O2 NC, RA at baseline. Pt has hooks catheter that was placed somewhere else, but there is no bag attached to it.

## 2023-12-12 NOTE — H&P ADULT - NSICDXPASTMEDICALHX_GEN_ALL_CORE_FT
PAST MEDICAL HISTORY:  Acute CHF     Afib     Anemia     Colon cancer     HLD (hyperlipidemia)     HTN (hypertension)     Osteoporosis     Skin cancer      PAST MEDICAL HISTORY:  Acute CHF     Afib     Anemia     Colon cancer     HLD (hyperlipidemia)     HTN (hypertension)     Osteoporosis     Skin cancer     Squamous cell skin cancer

## 2023-12-12 NOTE — PATIENT PROFILE ADULT - FALL HARM RISK - HARM RISK INTERVENTIONS
Assistance with ambulation/Assistance OOB with selected safe patient handling equipment/Communicate Risk of Fall with Harm to all staff/Discuss with provider need for PT consult/Monitor gait and stability/Provide patient with walking aids - walker, cane, crutches/Reinforce activity limits and safety measures with patient and family/Tailored Fall Risk Interventions/Visual Cue: Yellow wristband and red socks/Bed in lowest position, wheels locked, appropriate side rails in place/Call bell, personal items and telephone in reach/Instruct patient to call for assistance before getting out of bed or chair/Non-slip footwear when patient is out of bed/[place] to call system/Physically safe environment - no spills, clutter or unnecessary equipment/Purposeful Proactive Rounding/Room/bathroom lighting operational, light cord in reach Assistance with ambulation/Assistance OOB with selected safe patient handling equipment/Communicate Risk of Fall with Harm to all staff/Discuss with provider need for PT consult/Monitor gait and stability/Provide patient with walking aids - walker, cane, crutches/Reinforce activity limits and safety measures with patient and family/Tailored Fall Risk Interventions/Visual Cue: Yellow wristband and red socks/Bed in lowest position, wheels locked, appropriate side rails in place/Call bell, personal items and telephone in reach/Instruct patient to call for assistance before getting out of bed or chair/Non-slip footwear when patient is out of bed/[place] to call system/Physically safe environment - no spills, clutter or unnecessary equipment/Purposeful Proactive Rounding/Room/bathroom lighting operational, light cord in reach

## 2023-12-12 NOTE — ED ADULT NURSE REASSESSMENT NOTE - NS ED NURSE REASSESS COMMENT FT1
Pt received from RN Cheikh A&Ox4. Pt not in any obvious respiratory distress. Respirations equal and unlabored and SpO2 at 97%. Pt appears anxious on BiPAP machine and demands to be taken off. Plan of care discussed with JAM Bailey and pt taken off of BiPAP. SpO2 at 95% after taking off BiPAP Pt received from RN Cehikh A&Ox4. Pt not in any obvious respiratory distress. Respirations equal and unlabored and SpO2 at 97%. Pt appears anxious on BiPAP machine and demands to be taken off. Plan of care discussed with JAM Bailey and pt taken off of BiPAP. SpO2 at 95% after taking off BiPAP and SpO2 desats to 92-93% while speaking. Pt placed on NC at 2lpm and JAM Bailey notified. Pt bedding changed. Pt received from RN Cheikh A&Ox4. Pt not in any obvious respiratory distress. Respirations equal and unlabored and SpO2 at 97%. Pt appears anxious on BiPAP machine and demands to be taken off. Plan of care discussed with JAM Bailey and pt taken off of BiPAP. SpO2 at 95% after taking off BiPAP and SpO2 desats to 92-93% while speaking. Pt placed on NC at 2lpm and JAM Bailey notified. Pt bedding changed.

## 2023-12-12 NOTE — ED PROVIDER NOTE - OBJECTIVE STATEMENT
transfer from [facility] for cardiology admission for chf exacerbation. En route, hr reportedly dropped to 45-60 so diverted to ED for eval. Pt reports she currently feels fine. Denies chest pain, sob. Per prior note: "PMHx of A-fib on Eliquis, CHF, unknown EF, skin CA, walked in accompanied by family member c/o SOB and chest pressure x 2d. Pt reports having increased SOB/WOB in the past two days with chest pressure and pain since this morning. Has been taking all her home meds as directed and has appt with her Cardiologist Dr. Finkelstein at 2pm today for outpt TTE. Denies fever, chills, palpitations, diaphoresis, cough, hemoptysis, wheezing, worsening peripheral edema, focal weakness, numbness, tingling, paresthesia, HA, dizziness, neck pain, N/V/D/C, abdominal pain, change in urinary/bowel function, trauma, fall, rash, and malaise. Generally ambulates with a rolling walker" She was treated with lasix 60mg iv and nitro sl x1. Initially on bipap, weaned to 2L nc. transfer from [facility] for cardiology admission for chf exacerbation. En route, hr reportedly dropped to 45-60 so diverted to ED for eval. Pt reports she currently feels fine. Denies chest pain, sob. Per prior note: "PMHx of A-fib on Eliquis, CHF, unknown EF, skin CA, walked in accompanied by family member c/o SOB and chest pressure x 2d. Pt reports having increased SOB/WOB in the past two days with chest pressure and pain since this morning. Has been taking all her home meds as directed and has appt with her Cardiologist Dr. Finkelstein at 2pm today for outpt TTE. Denies fever, chills, palpitations, diaphoresis, cough, hemoptysis, wheezing, worsening peripheral edema, focal weakness, numbness, tingling, paresthesia, HA, dizziness, neck pain, N/V/D/C, abdominal pain, change in urinary/bowel function, trauma, fall, rash, and malaise. Generally ambulates with a rolling walker" She was treated with lasix 60mg iv and nitro sl x1. Initially on bipap, weaned to 2L nc.

## 2023-12-12 NOTE — ED ADULT TRIAGE NOTE - CHIEF COMPLAINT QUOTE
Pt walked into ER with family member c/o chest pressure and SOB x2 days worsening tonight around 2300. Pt denies dizziness, nv, or further associated complaints at triage. PMH Afib, CHF, and Hx of skin cancer.

## 2023-12-12 NOTE — ED PROVIDER NOTE - NSICDXPASTMEDICALHX_GEN_ALL_CORE_FT
PAST MEDICAL HISTORY:  Acute CHF     Afib     Skin cancer      PAST MEDICAL HISTORY:  Acute CHF     Afib     HTN (hypertension)     Skin cancer

## 2023-12-12 NOTE — H&P ADULT - NS ATTEND AMEND GEN_ALL_CORE FT
88 yo F, former smoker, with PMHx of A.fib (on Eliquis), HFpEF, HTN, HLD, osteoporosis, anemia, colon cancer (s/p partial colectomy 10/13/16 ), and SCC s/p multiple Moh's procedures who presented to [facility] accompanied by home caretaker complaining of worsening SOB/DOEx2 days with new localized substernal chest pressure onset this morning described as a transient intermittent sharp pain that resolves on its own. She further admits to chronic b/l LE edema at baseline. Pt had an appt with her cardiologist Dr. Finkelstein today for outpt TTE however entered the ED 2/2 worsening sx.  She endorses compliance with all her medications and denies any prior similar episodes, however does note that she was hospitalized 1 year ago for RSV and was discharged on home O2 but returned it a few months afterwards as she didn't require it. Denies any associated orthopnea, PND, palpitations, cough, diaphoresis, fever, chills, n/v/d, sick contacts, abd pain. Generally ambulates with a rolling walker.     1. HFpEF  Now appears euvolemic after iv diuresis. Echo, case discussed with Dr. Mckeon - if concerns for amyloid, PYP scan. 89 yo F, former smoker, with PMHx of A.fib (on Eliquis), HFpEF, HTN, HLD, osteoporosis, anemia, colon cancer (s/p partial colectomy 10/13/16 ), and SCC s/p multiple Moh's procedures who presented to [facility] accompanied by home caretaker complaining of worsening SOB/DOEx2 days with new localized substernal chest pressure onset this morning described as a transient intermittent sharp pain that resolves on its own. She further admits to chronic b/l LE edema at baseline. Pt had an appt with her cardiologist Dr. Finkelstein today for outpt TTE however entered the ED 2/2 worsening sx.  She endorses compliance with all her medications and denies any prior similar episodes, however does note that she was hospitalized 1 year ago for RSV and was discharged on home O2 but returned it a few months afterwards as she didn't require it. Denies any associated orthopnea, PND, palpitations, cough, diaphoresis, fever, chills, n/v/d, sick contacts, abd pain. Generally ambulates with a rolling walker.     1. HFpEF  Now appears euvolemic after iv diuresis. Echo, case discussed with Dr. Mckeon - if concerns for amyloid, PYP scan.

## 2023-12-12 NOTE — ED CDU PROVIDER INITIAL DAY NOTE - CLINICAL SUMMARY MEDICAL DECISION MAKING FREE TEXT BOX
89 yo F with PMHx of A-fib on Eliquis, CHF, unknown EF, skin CA, walked in accompanied by family member c/o SOB and increased WOB x 2 days, now with chest pressure and pain since this morning. EKG non ischemic appearing, baseline A.fib w rate controlled. Noted hypoxic to 92% on RA with minimal exertion. CXR with mild pulmonary vascular congestion and R sided effusion, afebrile, placed on bipap for WOB, SL NTG, additional diuretic ordered, r/o infectious and ischemic etiology. Will keep pt in obs for CHF exacerbation/cardiac monitoring and serial labs/tele monitoring. Pt has appt with private cardiologist Dr. Montana at 1:45pm today, will likely reach out to Dr. Montana for prompt and safe dispo later today 88 yo F with PMHx of A-fib on Eliquis, CHF, unknown EF, skin CA, walked in accompanied by family member c/o SOB and increased WOB x 2 days, now with chest pressure and pain since this morning. EKG non ischemic appearing, baseline A.fib w rate controlled. Noted hypoxic to 92% on RA with minimal exertion. CXR with mild pulmonary vascular congestion and R sided effusion, afebrile, placed on bipap for WOB, SL NTG, additional diuretic ordered, r/o infectious and ischemic etiology. Will keep pt in obs for CHF exacerbation/cardiac monitoring and serial labs/tele monitoring. Pt has appt with private cardiologist Dr. Montana at 1:45pm today, will likely reach out to Dr. Montana for prompt and safe dispo later today

## 2023-12-12 NOTE — ED CDU PROVIDER DISPOSITION NOTE - ATTENDING APP SHARED VISIT CONTRIBUTION OF CARE
migdalia - Patient with mild CHF exacerbation, attempted a trial of BiPAP but patient is persistently short of breath, will admit for continued diuresis and management of CHF exacerbation.

## 2023-12-12 NOTE — ED PROVIDER NOTE - OBJECTIVE STATEMENT
89 yo F with PMHx of A-fib on Eliquis, CHF, unknown EF, skin CA, walked in accompanied by family member c/o SOB and chest pressure x 2d 88 yo F with PMHx of A-fib on Eliquis, CHF, unknown EF, skin CA, walked in accompanied by family member c/o SOB and chest pressure x 2d 88 yo F with PMHx of A-fib on Eliquis, CHF, unknown EF, skin CA, walked in accompanied by family member c/o SOB and chest pressure x 2d. Pt reports having increased SOB/WOB in the past two days with chest pressure and pain. Has been taking all her home meds as directed and has appt with her Cardiologist Dr. Finkelstein at 2pm today for outpt TTE. Denies fever, chills, palpitations, diaphoresis, cough, hemoptysis, wheezing, worsening peripheral edema, focal weakness, numbness, tingling, paresthesia, HA, dizziness, neck pain, N/V/D/C, abdominal pain, change in urinary/bowel function, trauma, fall, rash, and malaise. Generally ambulates with a rolling walker 89 yo F with PMHx of A-fib on Eliquis, CHF, unknown EF, skin CA, walked in accompanied by family member c/o SOB and chest pressure x 2d. Pt reports having increased SOB/WOB in the past two days with chest pressure and pain. Has been taking all her home meds as directed and has appt with her Cardiologist Dr. Finkelstein at 2pm today for outpt TTE. Denies fever, chills, palpitations, diaphoresis, cough, hemoptysis, wheezing, worsening peripheral edema, focal weakness, numbness, tingling, paresthesia, HA, dizziness, neck pain, N/V/D/C, abdominal pain, change in urinary/bowel function, trauma, fall, rash, and malaise. Generally ambulates with a rolling walker 88 yo F with PMHx of A-fib on Eliquis, CHF, unknown EF, skin CA, walked in accompanied by family member c/o SOB and chest pressure x 2d. Pt reports having increased SOB/WOB in the past two days with chest pressure and pain since this morning. Has been taking all her home meds as directed and has appt with her Cardiologist Dr. Finkelstein at 2pm today for outpt TTE. Denies fever, chills, palpitations, diaphoresis, cough, hemoptysis, wheezing, worsening peripheral edema, focal weakness, numbness, tingling, paresthesia, HA, dizziness, neck pain, N/V/D/C, abdominal pain, change in urinary/bowel function, trauma, fall, rash, and malaise. Generally ambulates with a rolling walker 89 yo F with PMHx of A-fib on Eliquis, CHF, unknown EF, skin CA, walked in accompanied by family member c/o SOB and chest pressure x 2d. Pt reports having increased SOB/WOB in the past two days with chest pressure and pain since this morning. Has been taking all her home meds as directed and has appt with her Cardiologist Dr. Finkelstein at 2pm today for outpt TTE. Denies fever, chills, palpitations, diaphoresis, cough, hemoptysis, wheezing, worsening peripheral edema, focal weakness, numbness, tingling, paresthesia, HA, dizziness, neck pain, N/V/D/C, abdominal pain, change in urinary/bowel function, trauma, fall, rash, and malaise. Generally ambulates with a rolling walker.

## 2023-12-12 NOTE — ED ADULT TRIAGE NOTE - CHIEF COMPLAINT QUOTE
Pt to be direct [facility] for COPD exacerbation. En route pt hr dropped to 45-60 per EMS, EMS was told to come to ED.

## 2023-12-12 NOTE — ED CDU PROVIDER DISPOSITION NOTE - CLINICAL COURSE
chf exacerbation, right pleural effusion on cxr. patient was on bipap earlier in ED visit, now off of bipap as patient states it is too uncomfortable. 02 sat drops to low 90s while speaking. tried ambulation trial, 02 sat to mid 80s with walker, patient states she doesn't feel well when she tries to walk. discussed with her cardiologist Dr. Montana, will admit inpatient for further management and optimization. discussed with Dr. Cuevas cardiology at [facility] who accepted patient for admission. patient currently on 2L NC, comfortable appearing, no resp distress. chf exacerbation, right pleural effusion on cxr. patient was on bipap earlier in ED visit, now off of bipap as patient states it is too uncomfortable. 02 sat drops to low 90s while speaking. tried ambulation trial, 02 sat to mid 80s with walker, patient states she doesn't feel well when she tries to walk. discussed with her cardiologist Dr. Montana, will admit inpatient for further management and optimization. discussed with Dr. Cuevas cardiology at [facility] who accepted patient for admission. patient currently on 2L NC, comfortable appearing, no resp distress.

## 2023-12-12 NOTE — H&P ADULT - PROBLEM SELECTOR PLAN 1
P/w SOB/SANCHEZ x2 days. 1+ pitting LE edema (chronic), dec lung sounds b/l.  + warm, HD stable. Initially placed on BIPAP at [facility] for inc WOB, weaned to 2L w SpO2 94-99%  - Trop I @ [facility] I 12.4 > 15, BNP 2480  - Trop T @ [facility] 32, CK/CKMB neg  - EKG afib CXR 12/12: Linear atelectasis left base. Trace right pleural effusion. Cardiomegaly.  - TTE 11/25/22: EF 70%, small LV, severe biatrial dilation, aortic sclerosis, mild AI, mild MR, moderate TR.  - Stress test 9/21/16: Neg ECG, freq PVC's, normal perfusion (small mild anterior defect due to breast attenuation), EF 75%    - TTE ordered f/u  - S/p ___ IV/PO in ED  - C/w ____   - GDMT:  - Holding _____ i/s/o  - (Plan for ischemic workup?)  - I/Os Net Negative since admission ___   - Core measures, strict I&O's, daily weight, fluid restriction P/w SOB/SANCHEZ x2 days. 1+ pitting LE edema (chronic), dec lung sounds b/l.  + warm, HD stable. Initially placed on BIPAP at [facility] for inc WOB, weaned to 2L w SpO2 94-99%  - Trop I @ [facility] I 12.4 > 15, BNP 2480  - Trop T @ [facility] 32, CK/CKMB neg  - EKG afib CXR 12/12: Linear atelectasis left base. Trace right pleural effusion. Cardiomegaly.  - TTE 11/25/22: EF 70%, small LV, severe biatrial dilation, aortic sclerosis, mild AI, mild MR, moderate TR.  - Stress test 9/21/16: Neg ECG, freq PVC's, normal perfusion (small mild anterior defect due to breast attenuation), EF 75%    - TTE ordered f/u  - S/p ___ IV/PO in ED  - C/w ____   - GDMT:  - Holding _____ i/s/o  - (Plan for ischemic workup?)  - I/Os Net Negative since admission ___   - Core measures, strict I&O's, daily weight, fluid restriction P/w SOB/SANCHEZ x2 days. 1+ pitting LE edema (chronic), diminshed lung sounds b/l.  + warm, HD stable. Initially placed on BIPAP at [facility] for inc WOB, weaned to 2L w SpO2 94-99%  - Trop I @ [facility] I 12.4 > 15, BNP 2480, EKG rate controlled afib non ischemic  - Trop T @ [facility] 32, CK/CKMB neg  - CXR 12/12: Linear atelectasis left base. Trace right pleural effusion. Cardiomegaly.  - TTE 11/25/22: EF 70%, small LV, severe biatrial dilation, aortic sclerosis, mild AI, mild MR, moderate TR.  - Stress test 9/21/16: Neg ECG, freq PVC's, normal perfusion (small mild anterior defect due to breast attenuation), EF 75%  - TTE ordered  - Diuresis: Lasix 40mg IV BID  - GDMT: Continue Lopressor 25mg qd, Farxiga 10mg qd, Losartan 25mg qd  - Core measures, strict I&Os, daily weights, 1.2L fluid restriction P/w SOB/SANCHEZ x2 days. 1+ pitting LE edema (chronic), diminshed lung sounds b/l.  + warm, HD stable. Initially placed on BIPAP at [facility] for inc WOB, weaned to 2L w SpO2 94-99%  - Trop I @ [facility] I 12.4 > 15, BNP 2480, EKG rate controlled afib non ischemic  - Trop T @ [facility] 32, CK/CKMB neg  - CXR 12/12: Linear atelectasis left base. Trace right pleural effusion. Cardiomegaly.  - TTE 11/25/22: EF 70%, small LV, severe biatrial dilation, aortic sclerosis, mild AI, mild MR, moderate TR.  - Stress test 9/21/16: Neg ECG, freq PVC's, normal perfusion (small mild anterior defect due to breast attenuation), EF 75%  - TTE ordered  - Diuresis: Lasix 40mg IV BID  - GDMT: Continue Lopressor 25mg qd, Farxiga 10mg qd, Losartan 25mg qd  - Core measures, strict I&Os, daily weights, 1.2L fluid restriction P/w SOB/SANCHEZ x2 days. 1+ pitting LE edema (chronic), diminished lung sounds b/l.  Warm, HD stable. Initially placed on BIPAP at [facility] for inc WOB, weaned to 2L w SpO2 94-99%  - Trop I @ [facility] I 12.4 > 15, BNP 2480, EKG rate controlled afib non ischemic  - Trop T @ [facility] 32, CK/CKMB neg  - CXR 12/12: Linear atelectasis left base. Trace right pleural effusion. Cardiomegaly.  - TTE 11/25/22: EF 70%, small LV, severe biatrial dilation, aortic sclerosis, mild AI, mild MR, moderate TR.  - Stress test 9/21/16: Freq PVC's, normal perfusion (small mild anterior defect 2/2 breast attenuation), EF 75%  - S/p Lasix 60mg IVP x1 at [facility]   - TTE ordered  - Diuresis: Lasix 40mg IV BID  - GDMT: Continue Lopressor 25mg qd, Farxiga 10mg qd, Losartan 25mg qd  - Core measures, strict I&Os, daily weights, 1.2L fluid restriction P/w SOB/SANCHEZ x2 days. 1+ pitting LE edema (chronic), diminished lung sounds b/l.  Warm, HD stable. Initially placed on BIPAP at [facility] for inc WOB, weaned to 2L w SpO2 94-99%  - Trop I @ [facility] I 12.4 > 15, BNP 2480, EKG rate controlled afib non ischemic  - Trop T @ [facility] 32, CK/CKMB neg  - CXR 12/12: Linear atelectasis left base. Trace right pleural effusion. Cardiomegaly.  - TTE 11/25/22: EF 70%, small LV, severe biatrial dilation, aortic sclerosis, mild AI, mild MR, moderate TR.  - Stress test 9/21/16: Freq PVC's, normal perfusion (small mild anterior defect 2/2 breast attenuation), EF 75%  - S/p Lasix 60mg IVP x1 at [facility]   - TTE ordered  - Diuresis: Lasix 40mg IV BID  - GDMT: Continue Lopressor 25mg qd, Farxiga 10mg qd, Losartan 25mg qd  - Core measures, strict I&Os, daily weights, 1.2L fluid restriction

## 2023-12-12 NOTE — ED CDU PROVIDER INITIAL DAY NOTE - OBJECTIVE STATEMENT
89 yo F with PMHx of A-fib on Eliquis, CHF, unknown EF, skin CA, walked in accompanied by family member c/o SOB and chest pressure x 2d. Pt reports having increased SOB/WOB in the past two days with chest pressure and pain since this morning. Has been taking all her home meds as directed and has appt with her Cardiologist Dr. Finkelstein at 2pm today for outpt TTE. Denies fever, chills, palpitations, diaphoresis, cough, hemoptysis, wheezing, worsening peripheral edema, focal weakness, numbness, tingling, paresthesia, HA, dizziness, neck pain, N/V/D/C, abdominal pain, change in urinary/bowel function, trauma, fall, rash, and malaise. Generally ambulates with a rolling walker

## 2023-12-12 NOTE — ED PROVIDER NOTE - ATTENDING APP SHARED VISIT CONTRIBUTION OF CARE
Patient is an 89-year-old female with a history of atrial fibrillation (on Eliquis), CHF, and presents with shortness of breath and chest pressure for the past 2 days.  Patient describes shortness of breath is dyspnea on exertion and symptoms have progressively been worsening over 2 days.  Patient reports she has follow-up with her cardiologist, Dr. Finkelstein, across the street at 2 PM today.  She is scheduled for an echo.  Patient is very adamant that she makes this appointment but reports her symptoms worsen so she did not want to wait to be evaluated.  Review of systems as above.  Agree with PA exam as above.  EKG shows atrial fibrillation in the 70s.  Chest x-ray shows cardiomegaly and signs of venous congestion.  Labs significant for elevated BNP.  Troponin negative x 1.  Assessment and plan:  Patient's presentation is consistent with CHF exacerbation.  We started on BiPAP for oxygenation and gave IV Lasix concurrently.  Patient showed much improvement while on PAP BiPAP awaiting diuresis to kick in.  Discussed admission with patient but her and her daughter are very adamant that she does not want to be admitted to the hospital.  They are amenable to observation stay for serial troponins and additional monitoring until they are able to see their cardiologist across the street.  They admit that if Dr. Finkelstein would like her to return and be admitted to the hospital that they are amenable to that plan.

## 2023-12-12 NOTE — H&P ADULT - PROBLEM SELECTOR PLAN 4
Continue atorvastatin 20mg qhs  - F/u AM lipid panel    F: None  E: Replete if K<4 or Mag<2  N: DASH Diet  VTEppx: Eliquis  Dispo: Pending clinical progression; pt states she does not want to go to [facility]  PT: Eval once euvolemic Continue atorvastatin 20mg qhs  - F/u AM lipid panel    F: None  E: Replete if K<4 or Mag<2  N: DASH Diet  VTEppx: Eliquis  Dispo: Pending clinical progression; pt states she does not want to go to [facility]  PT: Eval once euvolemic

## 2023-12-12 NOTE — ED ADULT NURSE NOTE - NSFALLUNIVINTERV_ED_ALL_ED
Bed/Stretcher in lowest position, wheels locked, appropriate side rails in place/Call bell, personal items and telephone in reach/Instruct patient to call for assistance before getting out of bed/chair/stretcher/Non-slip footwear applied when patient is off stretcher/[place] to call system/Physically safe environment - no spills, clutter or unnecessary equipment/Purposeful proactive rounding/Room/bathroom lighting operational, light cord in reach Bed/Stretcher in lowest position, wheels locked, appropriate side rails in place/Call bell, personal items and telephone in reach/Instruct patient to call for assistance before getting out of bed/chair/stretcher/Non-slip footwear applied when patient is off stretcher/[place] to call system/Physically safe environment - no spills, clutter or unnecessary equipment/Purposeful proactive rounding/Room/bathroom lighting operational, light cord in reach

## 2023-12-12 NOTE — H&P ADULT - ASSESSMENT
88 yo F with PMHx of Sharon (on Eliquis), HFpEF, HTN, HLD, osteoporosis, anemia, colon cancer (10/13/16 s/p partial colectomy), and skin CA who presented to [facility] complaining of worsening SOB x2 days with new chest pressure onset this morning. Pt admitted to cardiac tele for further management of HFpEF exacerbation.  88 yo F with PMHx of [place] (on Eliquis), HFpEF, HTN, HLD, osteoporosis, anemia, colon cancer (10/13/16 s/p partial colectomy), and skin CA who presented to [facility] complaining of worsening SOB x2 days with new chest pressure onset this morning. Pt admitted to cardiac tele for further management of HFpEF exacerbation.  89 yo F with PMHx of A.fib (on Eliquis), HFpEF, HTN, HLD, osteoporosis, anemia, colon cancer (10/13/16 s/p partial colectomy), and SCC s/p multiple Moh's procedures who presented to [facility] complaining of worsening SOB/SANCHEZ x2 days. Pt admitted to cardiac tele for further management of HFpEF exacerbation.  88 yo F with PMHx of A.fib (on Eliquis), HFpEF, HTN, HLD, osteoporosis, anemia, colon cancer (10/13/16 s/p partial colectomy), and SCC s/p multiple Moh's procedures who presented to [facility] complaining of worsening SOB/SANCHEZ x2 days. Pt admitted to cardiac tele for further management of HFpEF exacerbation.

## 2023-12-12 NOTE — ED PROVIDER NOTE - PHYSICAL EXAMINATION
Gen - WDWN elderly F, slightly tachypneic, speaking in full sentences   Skin - warm, dry, intact   HEENT - AT/NC, PERRL, no nasal discharge, airway patent, neck supple and FROM, no JVD   CV - S1S2, irregularly irregular   Resp - diminished bs b/l with crackles at the bases, R>L, no r/w, no tripoding   GI - soft, ND, NT, no CVAT b/l   MS - 1+ BLE pitting edema, calves supple and NT, +PVD skin changes. No midline spinal tenderness or step off on palpation  Neuro - AxOx3, no focal neuro deficits, ambulatory with a walker

## 2023-12-12 NOTE — ED PROVIDER NOTE - NS ED MD DISPO DIVISION
Patient is in the lateral left side position.    [facility]

## 2023-12-12 NOTE — ED ADULT NURSE REASSESSMENT NOTE - NS ED NURSE REASSESS COMMENT FT1
Patient resting quietly. Bipap in place. Patient free from any distress. Cardiac monitoring in progress. Pt free from any chest pain at this time.

## 2023-12-12 NOTE — ED ADULT NURSE REASSESSMENT NOTE - NS ED NURSE REASSESS COMMENT FT1
Pt in no obvious distress at this time. Respirations equal and unlabored. VSS. Pt moved to bedside chair and provided nourishment. IV dressing changed.

## 2023-12-12 NOTE — ED CDU PROVIDER INITIAL DAY NOTE - ATTENDING APP SHARED VISIT CONTRIBUTION OF CARE
Assessment and plan:  Patient's presentation is consistent with CHF exacerbation.  We started on BiPAP for oxygenation and gave IV Lasix concurrently.  Patient showed much improvement while on PAP BiPAP awaiting diuresis to kick in.  Discussed admission with patient but her and her daughter are very adamant that she does not want to be admitted to the hospital.  They are amenable to observation stay for serial troponins and additional monitoring until they are able to see their cardiologist across the street.  They admit that if Dr. Finkelstein would like her to return and be admitted to the hospital that they are amenable to that plan.

## 2023-12-12 NOTE — ED ADULT TRIAGE NOTE - BIRTH SEX
April 6, 2022     Patient: Lesa Yanez   YOB: 2013   Date of Visit: 4/6/2022       To Whom it May Concern:    Lesa Yanez is under my professional care  He was seen in my office on 4/6/2022  He may return to school on Thursday April 7, 2022  If you have any questions or concerns, please don't hesitate to call           Sincerely,          Maria Esther Trotter DO        CC: No Recipients
Female

## 2023-12-12 NOTE — ED PROVIDER NOTE - NS ED ATTENDING STATEMENT MOD
This was a shared visit with the RACHAEL. I reviewed and verified the documentation and independently performed the documented:

## 2023-12-12 NOTE — ED PROVIDER NOTE - CLINICAL SUMMARY MEDICAL DECISION MAKING FREE TEXT BOX
transfer for cards admit. sent to ed for low hr en route. now appears clinically stable. suspect transient bradycardia related to afib. additional workup not needed at this time. will admit to cardiology as previously planned. discussed w fellow. transfer for cards admit. sent to ed for low hr en route. now appears clinically stable. suspect transient bradycardia related to afib. additional workup not needed at this time. will admit to cardiology as previously planned. discussed w PA, admit as planned

## 2023-12-12 NOTE — ED ADULT NURSE NOTE - NSFALLHARMRISKINTERV_ED_ALL_ED
Assistance OOB with selected safe patient handling equipment if applicable/Assistance with ambulation/Communicate risk of Fall with Harm to all staff, patient, and family/Monitor gait and stability/Provide patient with walking aids/Provide visual cue: red socks, yellow wristband, yellow gown, etc/Reinforce activity limits and safety measures with patient and family/Bed in lowest position, wheels locked, appropriate side rails in place/Call bell, personal items and telephone in reach/Instruct patient to call for assistance before getting out of bed/chair/stretcher/Non-slip footwear applied when patient is off stretcher/[place] to call system/Physically safe environment - no spills, clutter or unnecessary equipment/Purposeful Proactive Rounding/Room/bathroom lighting operational, light cord in reach Assistance OOB with selected safe patient handling equipment if applicable/Assistance with ambulation/Communicate risk of Fall with Harm to all staff, patient, and family/Monitor gait and stability/Provide patient with walking aids/Provide visual cue: red socks, yellow wristband, yellow gown, etc/Reinforce activity limits and safety measures with patient and family/Bed in lowest position, wheels locked, appropriate side rails in place/Call bell, personal items and telephone in reach/Instruct patient to call for assistance before getting out of bed/chair/stretcher/Non-slip footwear applied when patient is off stretcher/[place] to call system/Physically safe environment - no spills, clutter or unnecessary equipment/Purposeful Proactive Rounding/Room/bathroom lighting operational, light cord in reach

## 2023-12-13 LAB
A1C WITH ESTIMATED AVERAGE GLUCOSE RESULT: 5.5 % — SIGNIFICANT CHANGE UP (ref 4–5.6)
A1C WITH ESTIMATED AVERAGE GLUCOSE RESULT: 5.5 % — SIGNIFICANT CHANGE UP (ref 4–5.6)
ANION GAP SERPL CALC-SCNC: 11 MMOL/L — SIGNIFICANT CHANGE UP (ref 5–17)
BUN SERPL-MCNC: 20 MG/DL — SIGNIFICANT CHANGE UP (ref 7–23)
BUN SERPL-MCNC: 20 MG/DL — SIGNIFICANT CHANGE UP (ref 7–23)
BUN SERPL-MCNC: 26 MG/DL — HIGH (ref 7–23)
BUN SERPL-MCNC: 26 MG/DL — HIGH (ref 7–23)
BUN SERPL-MCNC: 27 MG/DL — HIGH (ref 7–23)
BUN SERPL-MCNC: 27 MG/DL — HIGH (ref 7–23)
CALCIUM SERPL-MCNC: 10.3 MG/DL — SIGNIFICANT CHANGE UP (ref 8.4–10.5)
CALCIUM SERPL-MCNC: 10.3 MG/DL — SIGNIFICANT CHANGE UP (ref 8.4–10.5)
CALCIUM SERPL-MCNC: 10.4 MG/DL — SIGNIFICANT CHANGE UP (ref 8.4–10.5)
CALCIUM SERPL-MCNC: 10.4 MG/DL — SIGNIFICANT CHANGE UP (ref 8.4–10.5)
CALCIUM SERPL-MCNC: 10.6 MG/DL — HIGH (ref 8.4–10.5)
CALCIUM SERPL-MCNC: 10.6 MG/DL — HIGH (ref 8.4–10.5)
CHLORIDE SERPL-SCNC: 100 MMOL/L — SIGNIFICANT CHANGE UP (ref 96–108)
CHLORIDE SERPL-SCNC: 100 MMOL/L — SIGNIFICANT CHANGE UP (ref 96–108)
CHLORIDE SERPL-SCNC: 98 MMOL/L — SIGNIFICANT CHANGE UP (ref 96–108)
CHLORIDE SERPL-SCNC: 98 MMOL/L — SIGNIFICANT CHANGE UP (ref 96–108)
CHLORIDE SERPL-SCNC: 99 MMOL/L — SIGNIFICANT CHANGE UP (ref 96–108)
CHLORIDE SERPL-SCNC: 99 MMOL/L — SIGNIFICANT CHANGE UP (ref 96–108)
CHOLEST SERPL-MCNC: 175 MG/DL — SIGNIFICANT CHANGE UP
CHOLEST SERPL-MCNC: 175 MG/DL — SIGNIFICANT CHANGE UP
CK MB CFR SERPL CALC: 3.7 NG/ML — SIGNIFICANT CHANGE UP (ref 0–6.7)
CK MB CFR SERPL CALC: 3.7 NG/ML — SIGNIFICANT CHANGE UP (ref 0–6.7)
CK SERPL-CCNC: 48 U/L — SIGNIFICANT CHANGE UP (ref 25–170)
CK SERPL-CCNC: 48 U/L — SIGNIFICANT CHANGE UP (ref 25–170)
CO2 SERPL-SCNC: 31 MMOL/L — SIGNIFICANT CHANGE UP (ref 22–31)
CO2 SERPL-SCNC: 31 MMOL/L — SIGNIFICANT CHANGE UP (ref 22–31)
CO2 SERPL-SCNC: 33 MMOL/L — HIGH (ref 22–31)
CO2 SERPL-SCNC: 33 MMOL/L — HIGH (ref 22–31)
CO2 SERPL-SCNC: 34 MMOL/L — HIGH (ref 22–31)
CO2 SERPL-SCNC: 34 MMOL/L — HIGH (ref 22–31)
CREAT SERPL-MCNC: 0.66 MG/DL — SIGNIFICANT CHANGE UP (ref 0.5–1.3)
CREAT SERPL-MCNC: 0.66 MG/DL — SIGNIFICANT CHANGE UP (ref 0.5–1.3)
CREAT SERPL-MCNC: 0.76 MG/DL — SIGNIFICANT CHANGE UP (ref 0.5–1.3)
CREAT SERPL-MCNC: 0.76 MG/DL — SIGNIFICANT CHANGE UP (ref 0.5–1.3)
CREAT SERPL-MCNC: 0.87 MG/DL — SIGNIFICANT CHANGE UP (ref 0.5–1.3)
CREAT SERPL-MCNC: 0.87 MG/DL — SIGNIFICANT CHANGE UP (ref 0.5–1.3)
D DIMER BLD IA.RAPID-MCNC: 202 NG/ML DDU — SIGNIFICANT CHANGE UP
D DIMER BLD IA.RAPID-MCNC: 202 NG/ML DDU — SIGNIFICANT CHANGE UP
EGFR: 64 ML/MIN/1.73M2 — SIGNIFICANT CHANGE UP
EGFR: 64 ML/MIN/1.73M2 — SIGNIFICANT CHANGE UP
EGFR: 75 ML/MIN/1.73M2 — SIGNIFICANT CHANGE UP
EGFR: 75 ML/MIN/1.73M2 — SIGNIFICANT CHANGE UP
EGFR: 84 ML/MIN/1.73M2 — SIGNIFICANT CHANGE UP
EGFR: 84 ML/MIN/1.73M2 — SIGNIFICANT CHANGE UP
ESTIMATED AVERAGE GLUCOSE: 111 MG/DL — SIGNIFICANT CHANGE UP (ref 68–114)
ESTIMATED AVERAGE GLUCOSE: 111 MG/DL — SIGNIFICANT CHANGE UP (ref 68–114)
GLUCOSE SERPL-MCNC: 130 MG/DL — HIGH (ref 70–99)
GLUCOSE SERPL-MCNC: 130 MG/DL — HIGH (ref 70–99)
GLUCOSE SERPL-MCNC: 139 MG/DL — HIGH (ref 70–99)
GLUCOSE SERPL-MCNC: 139 MG/DL — HIGH (ref 70–99)
GLUCOSE SERPL-MCNC: 95 MG/DL — SIGNIFICANT CHANGE UP (ref 70–99)
GLUCOSE SERPL-MCNC: 95 MG/DL — SIGNIFICANT CHANGE UP (ref 70–99)
HCT VFR BLD CALC: 45.4 % — HIGH (ref 34.5–45)
HCT VFR BLD CALC: 45.4 % — HIGH (ref 34.5–45)
HDLC SERPL-MCNC: 108 MG/DL — SIGNIFICANT CHANGE UP
HDLC SERPL-MCNC: 108 MG/DL — SIGNIFICANT CHANGE UP
HGB BLD-MCNC: 15.1 G/DL — SIGNIFICANT CHANGE UP (ref 11.5–15.5)
HGB BLD-MCNC: 15.1 G/DL — SIGNIFICANT CHANGE UP (ref 11.5–15.5)
LIPID PNL WITH DIRECT LDL SERPL: 56 MG/DL — SIGNIFICANT CHANGE UP
LIPID PNL WITH DIRECT LDL SERPL: 56 MG/DL — SIGNIFICANT CHANGE UP
MAGNESIUM SERPL-MCNC: 1.9 MG/DL — SIGNIFICANT CHANGE UP (ref 1.6–2.6)
MAGNESIUM SERPL-MCNC: 1.9 MG/DL — SIGNIFICANT CHANGE UP (ref 1.6–2.6)
MAGNESIUM SERPL-MCNC: 2 MG/DL — SIGNIFICANT CHANGE UP (ref 1.6–2.6)
MAGNESIUM SERPL-MCNC: 2 MG/DL — SIGNIFICANT CHANGE UP (ref 1.6–2.6)
MCHC RBC-ENTMCNC: 30.8 PG — SIGNIFICANT CHANGE UP (ref 27–34)
MCHC RBC-ENTMCNC: 30.8 PG — SIGNIFICANT CHANGE UP (ref 27–34)
MCHC RBC-ENTMCNC: 33.3 GM/DL — SIGNIFICANT CHANGE UP (ref 32–36)
MCHC RBC-ENTMCNC: 33.3 GM/DL — SIGNIFICANT CHANGE UP (ref 32–36)
MCV RBC AUTO: 92.7 FL — SIGNIFICANT CHANGE UP (ref 80–100)
MCV RBC AUTO: 92.7 FL — SIGNIFICANT CHANGE UP (ref 80–100)
NON HDL CHOLESTEROL: 67 MG/DL — SIGNIFICANT CHANGE UP
NON HDL CHOLESTEROL: 67 MG/DL — SIGNIFICANT CHANGE UP
NRBC # BLD: 0 /100 WBCS — SIGNIFICANT CHANGE UP (ref 0–0)
NRBC # BLD: 0 /100 WBCS — SIGNIFICANT CHANGE UP (ref 0–0)
PLATELET # BLD AUTO: 217 K/UL — SIGNIFICANT CHANGE UP (ref 150–400)
PLATELET # BLD AUTO: 217 K/UL — SIGNIFICANT CHANGE UP (ref 150–400)
POTASSIUM SERPL-MCNC: 3.4 MMOL/L — LOW (ref 3.5–5.3)
POTASSIUM SERPL-MCNC: 4.1 MMOL/L — SIGNIFICANT CHANGE UP (ref 3.5–5.3)
POTASSIUM SERPL-MCNC: 4.1 MMOL/L — SIGNIFICANT CHANGE UP (ref 3.5–5.3)
POTASSIUM SERPL-SCNC: 3.4 MMOL/L — LOW (ref 3.5–5.3)
POTASSIUM SERPL-SCNC: 4.1 MMOL/L — SIGNIFICANT CHANGE UP (ref 3.5–5.3)
POTASSIUM SERPL-SCNC: 4.1 MMOL/L — SIGNIFICANT CHANGE UP (ref 3.5–5.3)
RBC # BLD: 4.9 M/UL — SIGNIFICANT CHANGE UP (ref 3.8–5.2)
RBC # BLD: 4.9 M/UL — SIGNIFICANT CHANGE UP (ref 3.8–5.2)
RBC # FLD: 14.3 % — SIGNIFICANT CHANGE UP (ref 10.3–14.5)
RBC # FLD: 14.3 % — SIGNIFICANT CHANGE UP (ref 10.3–14.5)
SODIUM SERPL-SCNC: 142 MMOL/L — SIGNIFICANT CHANGE UP (ref 135–145)
SODIUM SERPL-SCNC: 142 MMOL/L — SIGNIFICANT CHANGE UP (ref 135–145)
SODIUM SERPL-SCNC: 143 MMOL/L — SIGNIFICANT CHANGE UP (ref 135–145)
TRIGL SERPL-MCNC: 56 MG/DL — SIGNIFICANT CHANGE UP
TRIGL SERPL-MCNC: 56 MG/DL — SIGNIFICANT CHANGE UP
TROPONIN T, HIGH SENSITIVITY RESULT: 35 NG/L — SIGNIFICANT CHANGE UP (ref 0–51)
TROPONIN T, HIGH SENSITIVITY RESULT: 35 NG/L — SIGNIFICANT CHANGE UP (ref 0–51)
WBC # BLD: 7.59 K/UL — SIGNIFICANT CHANGE UP (ref 3.8–10.5)
WBC # BLD: 7.59 K/UL — SIGNIFICANT CHANGE UP (ref 3.8–10.5)
WBC # FLD AUTO: 7.59 K/UL — SIGNIFICANT CHANGE UP (ref 3.8–10.5)
WBC # FLD AUTO: 7.59 K/UL — SIGNIFICANT CHANGE UP (ref 3.8–10.5)

## 2023-12-13 PROCEDURE — 93306 TTE W/DOPPLER COMPLETE: CPT | Mod: 26

## 2023-12-13 PROCEDURE — 93925 LOWER EXTREMITY STUDY: CPT | Mod: 26

## 2023-12-13 PROCEDURE — 99233 SBSQ HOSP IP/OBS HIGH 50: CPT

## 2023-12-13 RX ORDER — POTASSIUM CHLORIDE 20 MEQ
20 PACKET (EA) ORAL ONCE
Refills: 0 | Status: COMPLETED | OUTPATIENT
Start: 2023-12-13 | End: 2023-12-13

## 2023-12-13 RX ORDER — POTASSIUM CHLORIDE 20 MEQ
40 PACKET (EA) ORAL ONCE
Refills: 0 | Status: COMPLETED | OUTPATIENT
Start: 2023-12-13 | End: 2023-12-13

## 2023-12-13 RX ORDER — ACETAMINOPHEN 500 MG
650 TABLET ORAL ONCE
Refills: 0 | Status: COMPLETED | OUTPATIENT
Start: 2023-12-13 | End: 2023-12-13

## 2023-12-13 RX ORDER — MAGNESIUM OXIDE 400 MG ORAL TABLET 241.3 MG
400 TABLET ORAL ONCE
Refills: 0 | Status: COMPLETED | OUTPATIENT
Start: 2023-12-13 | End: 2023-12-13

## 2023-12-13 RX ADMIN — Medication 40 MILLIGRAM(S): at 13:36

## 2023-12-13 RX ADMIN — Medication 40 MILLIGRAM(S): at 06:13

## 2023-12-13 RX ADMIN — APIXABAN 2.5 MILLIGRAM(S): 2.5 TABLET, FILM COATED ORAL at 06:13

## 2023-12-13 RX ADMIN — Medication 20 MILLIEQUIVALENT(S): at 22:32

## 2023-12-13 RX ADMIN — MAGNESIUM OXIDE 400 MG ORAL TABLET 400 MILLIGRAM(S): 241.3 TABLET ORAL at 09:04

## 2023-12-13 RX ADMIN — Medication 2000 UNIT(S): at 06:13

## 2023-12-13 RX ADMIN — Medication 40 MILLIEQUIVALENT(S): at 09:04

## 2023-12-13 RX ADMIN — Medication 2000 UNIT(S): at 17:30

## 2023-12-13 RX ADMIN — Medication 40 MILLIEQUIVALENT(S): at 22:32

## 2023-12-13 RX ADMIN — Medication 20 MILLIEQUIVALENT(S): at 09:04

## 2023-12-13 RX ADMIN — APIXABAN 2.5 MILLIGRAM(S): 2.5 TABLET, FILM COATED ORAL at 17:30

## 2023-12-13 RX ADMIN — ATORVASTATIN CALCIUM 10 MILLIGRAM(S): 80 TABLET, FILM COATED ORAL at 22:31

## 2023-12-13 RX ADMIN — DAPAGLIFLOZIN 10 MILLIGRAM(S): 10 TABLET, FILM COATED ORAL at 13:25

## 2023-12-13 RX ADMIN — Medication 650 MILLIGRAM(S): at 19:44

## 2023-12-13 RX ADMIN — Medication 25 MILLIGRAM(S): at 06:13

## 2023-12-13 NOTE — PROGRESS NOTE ADULT - NS ATTEND AMEND GEN_ALL_CORE FT
89 yo F, former smoker, with PMHx of MARLON.zuleyka (on Eliquis), HFpEF, HTN, HLD, osteoporosis, anemia, colon cancer (s/p partial colectomy 10/13/16 ), and SCC s/p multiple Moh's procedures who presented to [facility] accompanied by home caretaker complaining of worsening SOB/DOEx2 days with new localized substernal chest pressure onset this morning described as a transient intermittent sharp pain that resolves on its own. She further admits to chronic b/l LE edema at baseline. Pt had an appt with her cardiologist Dr. Finkelstein today for outpt TTE however entered the ED 2/2 worsening sx.  She endorses compliance with all her medications and denies any prior similar episodes, however does note that she was hospitalized 1 year ago for RSV and was discharged on home O2 but returned it a few months afterwards as she didn't require it. Denies any associated orthopnea, PND, palpitations, cough, diaphoresis, fever, chills, n/v/d, sick contacts, abd pain. Generally ambulates with a rolling walker.     1. HFpEF  Now appears euvolemic after iv diuresis. Echo without amyloidosis features. oral diuretics. 88 yo F, former smoker, with PMHx of MARLON.zuleyka (on Eliquis), HFpEF, HTN, HLD, osteoporosis, anemia, colon cancer (s/p partial colectomy 10/13/16 ), and SCC s/p multiple Moh's procedures who presented to [facility] accompanied by home caretaker complaining of worsening SOB/DOEx2 days with new localized substernal chest pressure onset this morning described as a transient intermittent sharp pain that resolves on its own. She further admits to chronic b/l LE edema at baseline. Pt had an appt with her cardiologist Dr. Finkelstein today for outpt TTE however entered the ED 2/2 worsening sx.  She endorses compliance with all her medications and denies any prior similar episodes, however does note that she was hospitalized 1 year ago for RSV and was discharged on home O2 but returned it a few months afterwards as she didn't require it. Denies any associated orthopnea, PND, palpitations, cough, diaphoresis, fever, chills, n/v/d, sick contacts, abd pain. Generally ambulates with a rolling walker.     1. HFpEF  Now appears euvolemic after iv diuresis. Echo without amyloidosis features. oral diuretics.

## 2023-12-13 NOTE — PHYSICAL THERAPY INITIAL EVALUATION ADULT - PERTINENT HX OF CURRENT PROBLEM, REHAB EVAL
88 yo F with PMHx of A.fib (on Eliquis), HFpEF, HTN, HLD, osteoporosis, anemia, colon cancer (10/13/16 s/p partial colectomy), and SCC s/p multiple Moh's procedures who presented to [facility] complaining of worsening SOB/SANCHEZ x2 days. Pt admitted to cardiac tele for further management of HFpEF exacerbation. 88 yo F with PMHx of A.fib (on Eliquis), HFpEF, HTN, HLD, osteoporosis, anemia, colon cancer (10/13/16 s/p partial colectomy), and SCC s/p multiple Moh's procedures who presented to [facility] complaining of worsening SOB/SANCHEZ x2 days. Pt admitted to cardiac tele for further management of HFpEF exacerbation.

## 2023-12-13 NOTE — PROGRESS NOTE ADULT - SUBJECTIVE AND OBJECTIVE BOX
Cardiology PA Adult Progress Note    Subjective Assessment: Patient seen and examined by bedside this morning. No acute events overnight. Patient reports mild b/l leg cramping (AM K_+ 3.4 and repleated with f/u K+ at noon). Patient reports her breathing is much better. She states she drinks a lot of water at home. PA educated patient on water intake with HF.     ROS negative except as noted above.  	  MEDICATIONS:  furosemide   Injectable 40 milliGRAM(s) IV Push two times a day  losartan 25 milliGRAM(s) Oral daily  metoprolol succinate ER 25 milliGRAM(s) Oral daily  calcium carbonate    500 mG (Tums) Chewable 1 Tablet(s) Chew daily  atorvastatin 10 milliGRAM(s) Oral at bedtime  dapagliflozin 10 milliGRAM(s) Oral daily  apixaban 2.5 milliGRAM(s) Oral two times a day  cholecalciferol 2000 Unit(s) Oral two times a day      	    [PHYSICAL EXAM:  TELEMETRY:  T(C): 36.8 (12-13-23 @ 08:30), Max: 36.8 (12-13-23 @ 08:30)  HR: 70 (12-13-23 @ 12:06) (58 - 88)  BP: 96/51 (12-13-23 @ 12:06) (90/54 - 151/70)  RR: 18 (12-13-23 @ 12:06) (16 - 18)  SpO2: 95% (12-13-23 @ 12:06) (94% - 99%)  Wt(kg): --  I&O's Summary    12 Dec 2023 07:01  -  13 Dec 2023 07:00  --------------------------------------------------------  IN: 0 mL / OUT: 600 mL / NET: -600 mL    13 Dec 2023 07:01  -  13 Dec 2023 19:05  --------------------------------------------------------  IN: 520 mL / OUT: 800 mL / NET: -280 mL      Height (cm): 137.2 (12-12 @ 21:51)  Weight (kg): 40.8 (12-12 @ 21:51)  BMI (kg/m2): 21.7 (12-12 @ 21:51)  BSA (m2): 1.23 (12-12 @ 21:51)                                        Appearance: Normal	  HEENT:   Normal oral mucosa, PERRLA, EOMI	  Neck: Supple,  - JVD; Carotid Bruit   Cardiovascular: Normal S1 S2, No JVD, No murmurs,   Respiratory: Lungs clear to auscultation/Decreased Breath Sounds/No Rales, Rhonchi, Wheezing	  Gastrointestinal:  Soft, Non-tender, + BS	  Skin: No rashes, No ecchymoses, No cyanosis  Extremities: Normal range of motion, No clubbing, cyanosis or edema  Vascular: Peripheral pulses palpable 2+ bilaterally  Neurologic: Non-focal  Psychiatry: A & O x 3, Mood & affect appropriate      	    ECG:  	  RADIOLOGY:   DIAGNOSTIC TESTING:  [ ] Echocardiogram:   [ ]  Catheterization:  [ ] Stress Test:    [ ] JUAN  OTHER: 	    LABS:	 	  CARDIAC MARKERS:                           15.1   7.59  )-----------( 217      ( 13 Dec 2023 05:30 )             45.4     12-13    143  |  99  |  26<H>  ----------------------------<  139<H>  4.1   |  33<H>  |  0.76    Ca    10.6<H>      13 Dec 2023 13:05  Mg     1.9     12-13    TPro  7.1  /  Alb  3.5  /  TBili  0.8  /  DBili  x   /  AST  31  /  ALT  29  /  AlkPhos  95  12-12    proBNP:   Lipid Profile:   HgA1c:   TSH:

## 2023-12-13 NOTE — PHYSICAL THERAPY INITIAL EVALUATION ADULT - PLANNED THERAPY INTERVENTIONS, PT EVAL
balance training/bed mobility training/gait training/manual therapy techniques/neuromuscular re-education/postural re-education/prosthetic fitting/training/ROM/strengthening/stretching/transfer training

## 2023-12-13 NOTE — PROGRESS NOTE ADULT - PROBLEM SELECTOR PLAN 4
Continue atorvastatin 20mg qhs  - lipid panel 12/13: , , nHDL 67, LDL 56.     F: None  E: Replete if K<4 or Mag<2  N: DASH Diet  VTEppx: Eliquis  Dispo: Pending clinical progression; pt states she does not want to go to [facility]   PT: saw pt today Continue atorvastatin 20mg qhs  - lipid panel 12/13: , , nHDL 67, LDL 56.     F: None  E: Replete if K<4 or Mag<2  N: DASH Diet  VTEppx: Eliquis  Dispo: Pending clinical progression; pt states she does not want to go to [facility]   PT: saw pt today

## 2023-12-13 NOTE — PROGRESS NOTE ADULT - PROBLEM SELECTOR PLAN 1
P/w SOB/SANCHEZ x2 days. 1+ pitting LE edema (chronic), diminished lung sounds b/l.  Warm, HD stable. Initially placed on BIPAP at [facility] for inc WOB, weaned to 2L w SpO2 94-99%  [facility], EKG rate controlled afib non ischemic  - Trop T @ H 32, CK/CKMB neg  - CXR 12/12: Linear atelectasis left base. Trace right pleural effusion. Cardiomegaly.  - TTE 12/13: mild symmetric LVH, hyperdynamic LVSF EF 75%, mild AR, mild MR, mild-mod TR, dilated ascending aorta.   - Stress test 9/21/16: Freq PVC's, normal perfusion (small mild anterior defect 2/2 breast attenuation), EF 75%  - S/p Lasix 60mg IVP x1 at [facility]   - Diuresis: c/w Lasix 40mg IV BID (final dose in AM? pending volume status)   - GDMT: Continue Lopressor 25mg qd, Farxiga 10mg qd, Losartan 25mg qd  - Core measures, strict I&Os, daily weights, 1.2L fluid restriction P/w SOB/SANCHEZ x2 days. 1+ pitting LE edema (chronic), diminished lung sounds b/l.  Warm, HD stable. Initially placed on BIPAP at [facility] for inc WOB, weaned to 2L w SpO2 94-99%  [facility], BNP 2480, EKG rate controlled afib non ischemic  - Trop T @ H 32, CK/CKMB neg  - CXR 12/12: Linear atelectasis left base. Trace right pleural effusion. Cardiomegaly.  - TTE 12/13: mild symmetric LVH, hyperdynamic LVSF EF 75%, mild AR, mild MR, mild-mod TR, dilated ascending aorta.   - Stress test 9/21/16: Freq PVC's, normal perfusion (small mild anterior defect 2/2 breast attenuation), EF 75%  - S/p Lasix 60mg IVP x1 at [facility]   - Diuresis: c/w Lasix 40mg IV BID (final dose in AM? pending volume status)   - GDMT: Continue Lopressor 25mg qd, Farxiga 10mg qd, Losartan 25mg qd  - Core measures, strict I&Os, daily weights, 1.2L fluid restriction

## 2023-12-13 NOTE — PHYSICAL THERAPY INITIAL EVALUATION ADULT - ADDITIONAL COMMENTS
Pt lives in an apartment alone (recently ), ambulates with rollator at baseline, has HHA x 4 days per week.

## 2023-12-14 ENCOUNTER — TRANSCRIPTION ENCOUNTER (OUTPATIENT)
Age: 88
End: 2023-12-14

## 2023-12-14 VITALS
TEMPERATURE: 98 F | SYSTOLIC BLOOD PRESSURE: 109 MMHG | RESPIRATION RATE: 18 BRPM | OXYGEN SATURATION: 95 % | DIASTOLIC BLOOD PRESSURE: 66 MMHG | HEART RATE: 76 BPM

## 2023-12-14 PROBLEM — C18.9 MALIGNANT NEOPLASM OF COLON, UNSPECIFIED: Chronic | Status: ACTIVE | Noted: 2023-12-12

## 2023-12-14 PROBLEM — C44.92 SQUAMOUS CELL CARCINOMA OF SKIN, UNSPECIFIED: Chronic | Status: ACTIVE | Noted: 2023-12-12

## 2023-12-14 PROBLEM — E78.5 HYPERLIPIDEMIA, UNSPECIFIED: Chronic | Status: ACTIVE | Noted: 2023-12-12

## 2023-12-14 PROBLEM — D64.9 ANEMIA, UNSPECIFIED: Chronic | Status: ACTIVE | Noted: 2023-12-12

## 2023-12-14 PROBLEM — I50.9 HEART FAILURE, UNSPECIFIED: Chronic | Status: ACTIVE | Noted: 2023-12-12

## 2023-12-14 PROBLEM — C44.90 UNSPECIFIED MALIGNANT NEOPLASM OF SKIN, UNSPECIFIED: Chronic | Status: ACTIVE | Noted: 2023-12-12

## 2023-12-14 PROBLEM — M81.0 AGE-RELATED OSTEOPOROSIS WITHOUT CURRENT PATHOLOGICAL FRACTURE: Chronic | Status: ACTIVE | Noted: 2023-12-12

## 2023-12-14 LAB
ANION GAP SERPL CALC-SCNC: 13 MMOL/L — SIGNIFICANT CHANGE UP (ref 5–17)
ANION GAP SERPL CALC-SCNC: 13 MMOL/L — SIGNIFICANT CHANGE UP (ref 5–17)
BUN SERPL-MCNC: 26 MG/DL — HIGH (ref 7–23)
BUN SERPL-MCNC: 26 MG/DL — HIGH (ref 7–23)
CALCIUM SERPL-MCNC: 10.3 MG/DL — SIGNIFICANT CHANGE UP (ref 8.4–10.5)
CALCIUM SERPL-MCNC: 10.3 MG/DL — SIGNIFICANT CHANGE UP (ref 8.4–10.5)
CHLORIDE SERPL-SCNC: 105 MMOL/L — SIGNIFICANT CHANGE UP (ref 96–108)
CHLORIDE SERPL-SCNC: 105 MMOL/L — SIGNIFICANT CHANGE UP (ref 96–108)
CO2 SERPL-SCNC: 26 MMOL/L — SIGNIFICANT CHANGE UP (ref 22–31)
CO2 SERPL-SCNC: 26 MMOL/L — SIGNIFICANT CHANGE UP (ref 22–31)
CREAT SERPL-MCNC: 0.82 MG/DL — SIGNIFICANT CHANGE UP (ref 0.5–1.3)
CREAT SERPL-MCNC: 0.82 MG/DL — SIGNIFICANT CHANGE UP (ref 0.5–1.3)
EGFR: 68 ML/MIN/1.73M2 — SIGNIFICANT CHANGE UP
EGFR: 68 ML/MIN/1.73M2 — SIGNIFICANT CHANGE UP
GLUCOSE SERPL-MCNC: 117 MG/DL — HIGH (ref 70–99)
GLUCOSE SERPL-MCNC: 117 MG/DL — HIGH (ref 70–99)
HCT VFR BLD CALC: 48 % — HIGH (ref 34.5–45)
HCT VFR BLD CALC: 48 % — HIGH (ref 34.5–45)
HGB BLD-MCNC: 15.6 G/DL — HIGH (ref 11.5–15.5)
HGB BLD-MCNC: 15.6 G/DL — HIGH (ref 11.5–15.5)
MAGNESIUM SERPL-MCNC: 2.3 MG/DL — SIGNIFICANT CHANGE UP (ref 1.6–2.6)
MAGNESIUM SERPL-MCNC: 2.3 MG/DL — SIGNIFICANT CHANGE UP (ref 1.6–2.6)
MCHC RBC-ENTMCNC: 30.4 PG — SIGNIFICANT CHANGE UP (ref 27–34)
MCHC RBC-ENTMCNC: 30.4 PG — SIGNIFICANT CHANGE UP (ref 27–34)
MCHC RBC-ENTMCNC: 32.5 GM/DL — SIGNIFICANT CHANGE UP (ref 32–36)
MCHC RBC-ENTMCNC: 32.5 GM/DL — SIGNIFICANT CHANGE UP (ref 32–36)
MCV RBC AUTO: 93.4 FL — SIGNIFICANT CHANGE UP (ref 80–100)
MCV RBC AUTO: 93.4 FL — SIGNIFICANT CHANGE UP (ref 80–100)
NRBC # BLD: 0 /100 WBCS — SIGNIFICANT CHANGE UP (ref 0–0)
NRBC # BLD: 0 /100 WBCS — SIGNIFICANT CHANGE UP (ref 0–0)
PLATELET # BLD AUTO: 225 K/UL — SIGNIFICANT CHANGE UP (ref 150–400)
PLATELET # BLD AUTO: 225 K/UL — SIGNIFICANT CHANGE UP (ref 150–400)
POTASSIUM SERPL-MCNC: 5.1 MMOL/L — SIGNIFICANT CHANGE UP (ref 3.5–5.3)
POTASSIUM SERPL-MCNC: 5.1 MMOL/L — SIGNIFICANT CHANGE UP (ref 3.5–5.3)
POTASSIUM SERPL-SCNC: 5.1 MMOL/L — SIGNIFICANT CHANGE UP (ref 3.5–5.3)
POTASSIUM SERPL-SCNC: 5.1 MMOL/L — SIGNIFICANT CHANGE UP (ref 3.5–5.3)
RBC # BLD: 5.14 M/UL — SIGNIFICANT CHANGE UP (ref 3.8–5.2)
RBC # BLD: 5.14 M/UL — SIGNIFICANT CHANGE UP (ref 3.8–5.2)
RBC # FLD: 14.2 % — SIGNIFICANT CHANGE UP (ref 10.3–14.5)
RBC # FLD: 14.2 % — SIGNIFICANT CHANGE UP (ref 10.3–14.5)
SODIUM SERPL-SCNC: 144 MMOL/L — SIGNIFICANT CHANGE UP (ref 135–145)
SODIUM SERPL-SCNC: 144 MMOL/L — SIGNIFICANT CHANGE UP (ref 135–145)
WBC # BLD: 8.08 K/UL — SIGNIFICANT CHANGE UP (ref 3.8–10.5)
WBC # BLD: 8.08 K/UL — SIGNIFICANT CHANGE UP (ref 3.8–10.5)
WBC # FLD AUTO: 8.08 K/UL — SIGNIFICANT CHANGE UP (ref 3.8–10.5)
WBC # FLD AUTO: 8.08 K/UL — SIGNIFICANT CHANGE UP (ref 3.8–10.5)

## 2023-12-14 PROCEDURE — 83036 HEMOGLOBIN GLYCOSYLATED A1C: CPT

## 2023-12-14 PROCEDURE — 80048 BASIC METABOLIC PNL TOTAL CA: CPT

## 2023-12-14 PROCEDURE — 93925 LOWER EXTREMITY STUDY: CPT

## 2023-12-14 PROCEDURE — 80061 LIPID PANEL: CPT

## 2023-12-14 PROCEDURE — 97161 PT EVAL LOW COMPLEX 20 MIN: CPT

## 2023-12-14 PROCEDURE — 85027 COMPLETE CBC AUTOMATED: CPT

## 2023-12-14 PROCEDURE — 83735 ASSAY OF MAGNESIUM: CPT

## 2023-12-14 PROCEDURE — 99285 EMERGENCY DEPT VISIT HI MDM: CPT

## 2023-12-14 PROCEDURE — 85379 FIBRIN DEGRADATION QUANT: CPT

## 2023-12-14 PROCEDURE — 82550 ASSAY OF CK (CPK): CPT

## 2023-12-14 PROCEDURE — 82553 CREATINE MB FRACTION: CPT

## 2023-12-14 PROCEDURE — 36415 COLL VENOUS BLD VENIPUNCTURE: CPT

## 2023-12-14 PROCEDURE — 84484 ASSAY OF TROPONIN QUANT: CPT

## 2023-12-14 PROCEDURE — 99239 HOSP IP/OBS DSCHRG MGMT >30: CPT

## 2023-12-14 PROCEDURE — 93306 TTE W/DOPPLER COMPLETE: CPT

## 2023-12-14 RX ORDER — FUROSEMIDE 40 MG
1 TABLET ORAL
Qty: 0 | Refills: 0 | DISCHARGE

## 2023-12-14 RX ADMIN — DAPAGLIFLOZIN 10 MILLIGRAM(S): 10 TABLET, FILM COATED ORAL at 12:53

## 2023-12-14 RX ADMIN — Medication 40 MILLIGRAM(S): at 06:07

## 2023-12-14 RX ADMIN — APIXABAN 2.5 MILLIGRAM(S): 2.5 TABLET, FILM COATED ORAL at 06:06

## 2023-12-14 RX ADMIN — Medication 25 MILLIGRAM(S): at 06:06

## 2023-12-14 RX ADMIN — Medication 2000 UNIT(S): at 06:06

## 2023-12-14 RX ADMIN — LOSARTAN POTASSIUM 25 MILLIGRAM(S): 100 TABLET, FILM COATED ORAL at 06:06

## 2023-12-14 NOTE — DISCHARGE NOTE PROVIDER - NSDCFUSCHEDAPPT_GEN_ALL_CORE_FT
Demetrio Montana Physician [facility]  HEARTVASC [address]  Scheduled Appointment: 01/02/2024     Demetrio Montana Physician [facility]  HEARTVASC [address]  Scheduled Appointment: 01/02/2024     Demetrio Montana Physician [facility]  HEARTVASC [address]  Scheduled Appointment: 12/20/2023     Demetrio Montana Physician [facility]  HEARTVASC [address]  Scheduled Appointment: 12/20/2023

## 2023-12-14 NOTE — PROVIDER CONTACT NOTE (CHANGE IN STATUS NOTIFICATION) - SITUATION
Called to patient's room for c/o chest pain which is coming and going. Pain described as sharp pinch, no numeric value given to pain intensity. Pain described as "the same pain I had on admit".

## 2023-12-14 NOTE — DISCHARGE NOTE PROVIDER - NSDCCPCAREPLAN_GEN_ALL_CORE_FT
PRINCIPAL DISCHARGE DIAGNOSIS  Diagnosis: Acute decompensated heart failure  Assessment and Plan of Treatment: -You have a history of weakened heart muscle called congestive heart failure. This means that your heart does not pump blood to the rest of the body as it normally should. You should take LASIX (FUROSEMIDE) 40 mg once daily to help reduce the amount of excess fluid that can back up to the legs, kidneys, and lungs.    -Please make sure you follow up with your cardiologist within one week of discharge.   -Please weigh yourself daily: if you have gained more than 2-3 lbs in one day or 5 lbs in one week contact your doctor immediately as you may be retaining water weight  - Additionally, please continue Irbesartan 75 mg daily, Farxiga 10 mg daily, Toprol XL 25 mg daily, and Farxiga 10 mg daily.   -In addition, restrict your salt intake to less than 2 grams a day  - PLEASE FOLLOW-up with Dr. Finkelstein on 01/02/2023 at 2:40 PM.   -If you develop worsening shortening of breath, leg swelling, fatigue, chest pain, difficulty sleeping at night due to shortness of breath, contact your cardiologist immediately.        SECONDARY DISCHARGE DIAGNOSES  Diagnosis: Persistent atrial fibrillation  Assessment and Plan of Treatment: You were found to be in atrial fibrillation (a type of irregular heartbeat) here in the hospital. People with atrial fibrillation are at an increased risk of forming a blood clot in the heart, which can travel to the brain, causing a stroke, or to other parts of the body. You have been placed on a Eliquis 5 mg twice daily, it is important you take this medication as directed. ELIQUIS lowers your chance of having a stroke by helping to prevent clots from forming. If you stop taking ELIQUIS, you may have increased risk of forming a blood clot in your heart which can cause a stroke.

## 2023-12-14 NOTE — PROVIDER CONTACT NOTE (CHANGE IN STATUS NOTIFICATION) - ACTION/TREATMENT ORDERED:
VSS, 12 lead performed and reviewed by Cy Nobles. No changes on EKG per Cy Nobles. Pt refused pain management at this time. Pt encouraged to notify staff if pain persists or worsens. Pt verbalized understanding. No additional action at this time per Cy Nobles.

## 2023-12-14 NOTE — DISCHARGE NOTE NURSING/CASE MANAGEMENT/SOCIAL WORK - NSDCPEFALRISK_GEN_ALL_CORE
For information on Fall & Injury Prevention, visit: [email or web address] OR  [email or web address] OR  https://www.cdc.gov/steadi/patient.html For information on Fall & Injury Prevention, visit: [email or web address] OR  [email or web address] OR  https://www.cdc.gov/steadi/patient.html

## 2023-12-14 NOTE — DISCHARGE NOTE NURSING/CASE MANAGEMENT/SOCIAL WORK - PATIENT PORTAL LINK FT
You can access the FollowMyHealth Patient Portal offered by [facility] by registering at the following website: [email or web address]. By joining [employer]’s FollowMyHealth portal, you will also be able to view your health information using other applications (apps) compatible with our system. You can access the FollowMyHealth Patient Portal offered by [facility] by registering at the following website: [email or web address]. By joining [employer]’s FollowMyHealth portal, you will also be able to view your health information using other applications (apps) compatible with our system.

## 2023-12-14 NOTE — GOALS OF CARE CONVERSATION - ADVANCED CARE PLANNING - CONVERSATION DETAILS
LACE > 11, GOC discussed with patient &/or (NAME OF FAMILY MEMBER or OTHER PARTY).  Discussion included but not limited to who is Health Care Proxy, Code status, MOLST, diagnosis, prognosis, treatment options, risks and benefits, comfort measures, pain management, Home Care, and palliative care referral reviewed as best as possible.  Per Patient’s wishes He/She &/Or HCP confirmed Patient to be FULL CODE

## 2023-12-14 NOTE — DISCHARGE NOTE PROVIDER - NSDCMRMEDTOKEN_GEN_ALL_CORE_FT
apixaban 2.5 mg oral tablet: 1 tab(s) orally 2 times a day  atorvastatin 10 mg oral tablet: 1 tab(s) orally once a day  calcium carbonate 1000 mg oral tablet, chewable: 2 tab(s) chewed once a day  Farxiga 10 mg oral tablet: 1 tab(s) orally once a day  furosemide 40 mg oral tablet: 1 tab(s) orally once a day  irbesartan 75 mg oral tablet: 1 tab(s) orally once a day  metoprolol succinate 25 mg oral tablet, extended release: 1 tab(s) orally once a day  Vitamin D3 50 mcg (2000 intl units) oral tablet: 1 tab(s) orally 2 times a day

## 2023-12-14 NOTE — DISCHARGE NOTE PROVIDER - PROVIDER TOKENS
PROVIDER:[TOKEN:[[id number]],SCHEDULEDAPPT:[01/02/2024],SCHEDULEDAPPTTIME:[02:40 PM]] PROVIDER:[TOKEN:[[id number]],SCHEDULEDAPPT:[01/02/2024],SCHEDULEDAPPTTIME:[02:40 PM]]

## 2023-12-14 NOTE — DISCHARGE NOTE PROVIDER - HOSPITAL COURSE
Referring Physician: Dr. Finkelstein     Hospital Course:  88 yo F, former smoker, with PMHx of PERSISTENT A.fib (on Eliquis), HFpEF, HTN, HLD, osteoporosis, anemia, colon cancer (s/p partial colectomy 10/13/16 ), and SCC s/p multiple Moh's procedures who presented to [facility] accompanied by home caretaker complaining of worsening SOB/DOEx2 days with new localized substernal chest pressure onset this morning described as a transient intermittent sharp pain that resolves on its own. She further admits to chronic b/l LE edema at baseline. Pt had an appt with her cardiologist Dr. Finkelstein today for outpt TTE however entered the ED 2/2 worsening sx.  She endorses compliance with all her medications and denies any prior similar episodes, however does note that she was hospitalized 1 year ago for RSV and was discharged on home O2 but returned it a few months afterwards as she didn't require it.  En route to [facility], per EMS pt in afib rate 45-60 w BP 92/51 thus pt diverted to ED for initial assessment.   Patient admitted to cardiology/telemetry for acute on chronic suystolic heart failure with preserved ejection fraction. Patient had a TTE 12/13: mild symmetric LVH, hyperdynamic LVSF EF 75%, mild AR, mild MR, mild-mod TR, dilated ascending aorta.   Patient underwent diuresis with Lasix 40 mg IVP BID and transitioned to Lasix 40 mg PO daily.  Pt continued on GDMT  - Toprol XL 25 mg qd, Farxiga 10mg qd, Irbesartan 75mg qd.   Patient with rate controlled persistent atrial fibrillation, her heart rate is in 80s. PT saw patient and recommended home PT.     SUBJECTIVE ASSESSMENT:  Pt. seen and examined at bedside this morning, without complaints and is out of bed ambulating.  Home medication regimen reviewed with Dr. Juarez.  Patient is to continue Home medications (FILL IN HERE) and all other home medications.  Prescriptions for (Lasix 40 mg daily) e-prescribed to patient’s preferred pharmacy.  Pt. is stable for discharge as per Dr. Juarez  and will f/u with Dr. Montana on _______.  Patient has been given appropriate discharge instructions including medication regimen, access site management and follow up.      PHYSICAL EXAM:  HEENT:   Normal oral mucosa, PERRLA, EOMI	  Neck: Supple,  - JVD; Carotid Bruit   Cardiovascular: Normal S1 S2, No JVD, No murmurs,   Respiratory: Lungs clear to auscultation/Decreased Breath Sounds/No Rales, Rhonchi, Wheezing	  Gastrointestinal:  Soft, Non-tender, + BS	  Skin: No rashes, No ecchymoses, No cyanosis  Extremities: Normal range of motion, No clubbing, cyanosis or edema  Vascular: Peripheral pulses palpable 2+ bilaterally  Neurologic: Non-focal  Psychiatry: A & O x 3, Mood & affect appropriate    Dx: Heart Failure, : YES         If Yes to CHF: 7 day Follow-Up Appointment Made: Yes, patient scheduled to see Dr. Montana on  1/02/2023 at 2:40 PM. They did not have any sooner appointments. Patient will be called if she can get in sooner.            Cardiac Rehab (CHF):            *Education on benefits of Cardiac Rehab provided to patient: Yes         *Pt given list of locations & instructed to contact their insurance company to review list of participating providers. Yes         *Referral and Prescription Given for Cardiac Rehab: No           (Reasons for No Cardiac Rehab Referral Rx - document 1 or more options):              Medical Reason: ex needs Home Care, Home PT        Referring Physician: Dr. Finkelstein     Hospital Course:  89 yo F, former smoker, with PMHx of PERSISTENT A.fib (on Eliquis), HFpEF, HTN, HLD, osteoporosis, anemia, colon cancer (s/p partial colectomy 10/13/16 ), and SCC s/p multiple Moh's procedures who presented to [facility] accompanied by home caretaker complaining of worsening SOB/DOEx2 days with new localized substernal chest pressure onset this morning described as a transient intermittent sharp pain that resolves on its own. She further admits to chronic b/l LE edema at baseline. Pt had an appt with her cardiologist Dr. Finkelstein today for outpt TTE however entered the ED 2/2 worsening sx.  She endorses compliance with all her medications and denies any prior similar episodes, however does note that she was hospitalized 1 year ago for RSV and was discharged on home O2 but returned it a few months afterwards as she didn't require it.  En route to [facility], per EMS pt in afib rate 45-60 w BP 92/51 thus pt diverted to ED for initial assessment.   Patient admitted to cardiology/telemetry for acute on chronic suystolic heart failure with preserved ejection fraction. Patient had a TTE 12/13: mild symmetric LVH, hyperdynamic LVSF EF 75%, mild AR, mild MR, mild-mod TR, dilated ascending aorta.   Patient underwent diuresis with Lasix 40 mg IVP BID and transitioned to Lasix 40 mg PO daily.  Pt continued on GDMT  - Toprol XL 25 mg qd, Farxiga 10mg qd, Irbesartan 75mg qd.   Patient with rate controlled persistent atrial fibrillation, her heart rate is in 80s. PT saw patient and recommended home PT.     SUBJECTIVE ASSESSMENT:  Pt. seen and examined at bedside this morning, without complaints and is out of bed ambulating.  Home medication regimen reviewed with Dr. Juarez.  Patient is to continue Home medications (FILL IN HERE) and all other home medications.  Prescriptions for (Lasix 40 mg daily) e-prescribed to patient’s preferred pharmacy.  Pt. is stable for discharge as per Dr. Juarez  and will f/u with Dr. Montana on _______.  Patient has been given appropriate discharge instructions including medication regimen, access site management and follow up.      PHYSICAL EXAM:  HEENT:   Normal oral mucosa, PERRLA, EOMI	  Neck: Supple,  - JVD; Carotid Bruit   Cardiovascular: Normal S1 S2, No JVD, No murmurs,   Respiratory: Lungs clear to auscultation/Decreased Breath Sounds/No Rales, Rhonchi, Wheezing	  Gastrointestinal:  Soft, Non-tender, + BS	  Skin: No rashes, No ecchymoses, No cyanosis  Extremities: Normal range of motion, No clubbing, cyanosis or edema  Vascular: Peripheral pulses palpable 2+ bilaterally  Neurologic: Non-focal  Psychiatry: A & O x 3, Mood & affect appropriate    Dx: Heart Failure, : YES         If Yes to CHF: 7 day Follow-Up Appointment Made: Yes, patient scheduled to see Dr. Montana on  1/02/2023 at 2:40 PM. They did not have any sooner appointments. Patient will be called if she can get in sooner.            Cardiac Rehab (CHF):            *Education on benefits of Cardiac Rehab provided to patient: Yes         *Pt given list of locations & instructed to contact their insurance company to review list of participating providers. Yes         *Referral and Prescription Given for Cardiac Rehab: No           (Reasons for No Cardiac Rehab Referral Rx - document 1 or more options):              Medical Reason: ex needs Home Care, Home PT        Referring Physician: Dr. Finkelstein     Hospital Course:  89 yo F, former smoker, with PMHx of PERSISTENT A.fib (on Eliquis), HFpEF, HTN, HLD, osteoporosis, anemia, colon cancer (s/p partial colectomy 10/13/16 ), and SCC s/p multiple Moh's procedures who presented to [facility] accompanied by home caretaker complaining of worsening SOB/DOEx2 days with new localized substernal chest pressure onset this morning described as a transient intermittent sharp pain that resolves on its own. She further admits to chronic b/l LE edema at baseline. Pt had an appt with her cardiologist Dr. Finkelstein today for outpt TTE however entered the ED 2/2 worsening sx.  She endorses compliance with all her medications and denies any prior similar episodes, however does note that she was hospitalized 1 year ago for RSV and was discharged on home O2 but returned it a few months afterwards as she didn't require it.  En route to [facility], per EMS pt in afib rate 45-60 w BP 92/51 thus pt diverted to ED for initial assessment.   Patient admitted to cardiology/telemetry for acute on chronic suystolic heart failure with preserved ejection fraction. Patient had a TTE 12/13: mild symmetric LVH, hyperdynamic LVSF EF 75%, mild AR, mild MR, mild-mod TR, dilated ascending aorta.   Patient underwent diuresis with Lasix 40 mg IVP BID and transitioned to Lasix 40 mg PO daily.  Pt continued on GDMT  - Toprol XL 25 mg qd, Farxiga 10mg qd, Irbesartan 75mg qd.   Patient with rate controlled persistent atrial fibrillation, her heart rate is in 80s. PT saw patient and recommended home PT.     SUBJECTIVE ASSESSMENT:  Pt. seen and examined at bedside this morning, without complaints and is out of bed ambulating.  Home medication regimen reviewed with Dr. Juarez.  Patient is to continue Home medications ( Toprl XL 25 mg daily, Farxiga 10 mg daily, Irbesartan 75 mg daily, Lasix 40 mg daily, and Atorvastatin 40 mg daily.  Prescriptions for (Lasix 40 mg daily) e-prescribed to patient’s preferred pharmacy.  Pt. is stable for discharge as per Dr. Juarez  and will f/u with Dr. Montana on 1/02/23 at 2:40 PM.  Patient has been given appropriate discharge instructions including medication regimen, access site management and follow up.      PHYSICAL EXAM:  HEENT:   Normal oral mucosa, PERRLA, EOMI	  Neck: Supple,  - JVD; Carotid Bruit   Cardiovascular: + irregulary, irregular   Respiratory: Lungs clear to auscultation  Gastrointestinal:  Soft, Non-tender, + BS	  Skin: No rashes, No ecchymoses, No cyanosis  Extremities: Normal range of motion, No clubbing, cyanosis or edema  Vascular: Peripheral pulses palpable 2+ bilaterally  Neurologic: Non-focal  Psychiatry: A & O x 3, Mood & affect appropriate    Dx: Heart Failure, : YES         If Yes to CHF: 7 day Follow-Up Appointment Made: Yes, patient scheduled to see Dr. Montana on  1/02/2023 at 2:40 PM. They did not have any sooner appointments. Patient will be called if she can get in sooner.            Cardiac Rehab (CHF):            *Education on benefits of Cardiac Rehab provided to patient: Yes         *Pt given list of locations & instructed to contact their insurance company to review list of participating providers. Yes         *Referral and Prescription Given for Cardiac Rehab: No           (Reasons for No Cardiac Rehab Referral Rx - document 1 or more options):              Medical Reason: ex needs Home Care, Home PT        Referring Physician: Dr. Finkelstein     Hospital Course:  88 yo F, former smoker, with PMHx of PERSISTENT A.fib (on Eliquis), HFpEF, HTN, HLD, osteoporosis, anemia, colon cancer (s/p partial colectomy 10/13/16 ), and SCC s/p multiple Moh's procedures who presented to [facility] accompanied by home caretaker complaining of worsening SOB/DOEx2 days with new localized substernal chest pressure onset this morning described as a transient intermittent sharp pain that resolves on its own. She further admits to chronic b/l LE edema at baseline. Pt had an appt with her cardiologist Dr. Finkelstein today for outpt TTE however entered the ED 2/2 worsening sx.  She endorses compliance with all her medications and denies any prior similar episodes, however does note that she was hospitalized 1 year ago for RSV and was discharged on home O2 but returned it a few months afterwards as she didn't require it.  En route to [facility], per EMS pt in afib rate 45-60 w BP 92/51 thus pt diverted to ED for initial assessment.   Patient admitted to cardiology/telemetry for acute on chronic suystolic heart failure with preserved ejection fraction. Patient had a TTE 12/13: mild symmetric LVH, hyperdynamic LVSF EF 75%, mild AR, mild MR, mild-mod TR, dilated ascending aorta.   Patient underwent diuresis with Lasix 40 mg IVP BID and transitioned to Lasix 40 mg PO daily.  Pt continued on GDMT  - Toprol XL 25 mg qd, Farxiga 10mg qd, Irbesartan 75mg qd.   Patient with rate controlled persistent atrial fibrillation, her heart rate is in 80s. PT saw patient and recommended home PT.     SUBJECTIVE ASSESSMENT:  Pt. seen and examined at bedside this morning, without complaints and is out of bed ambulating.  Home medication regimen reviewed with Dr. Juarez.  Patient is to continue Home medications ( Toprl XL 25 mg daily, Farxiga 10 mg daily, Irbesartan 75 mg daily, Lasix 40 mg daily, and Atorvastatin 40 mg daily.  Prescriptions for (Lasix 40 mg daily) e-prescribed to patient’s preferred pharmacy.  Pt. is stable for discharge as per Dr. Juarez  and will f/u with Dr. Montana on 1/02/23 at 2:40 PM.  Patient has been given appropriate discharge instructions including medication regimen, access site management and follow up.      PHYSICAL EXAM:  HEENT:   Normal oral mucosa, PERRLA, EOMI	  Neck: Supple,  - JVD; Carotid Bruit   Cardiovascular: + irregulary, irregular   Respiratory: Lungs clear to auscultation  Gastrointestinal:  Soft, Non-tender, + BS	  Skin: No rashes, No ecchymoses, No cyanosis  Extremities: Normal range of motion, No clubbing, cyanosis or edema  Vascular: Peripheral pulses palpable 2+ bilaterally  Neurologic: Non-focal  Psychiatry: A & O x 3, Mood & affect appropriate    Dx: Heart Failure, : YES         If Yes to CHF: 7 day Follow-Up Appointment Made: Yes, patient scheduled to see Dr. Montana on  1/02/2023 at 2:40 PM. They did not have any sooner appointments. Patient will be called if she can get in sooner.            Cardiac Rehab (CHF):            *Education on benefits of Cardiac Rehab provided to patient: Yes         *Pt given list of locations & instructed to contact their insurance company to review list of participating providers. Yes         *Referral and Prescription Given for Cardiac Rehab: No           (Reasons for No Cardiac Rehab Referral Rx - document 1 or more options):              Medical Reason: ex needs Home Care, Home PT

## 2023-12-14 NOTE — DISCHARGE NOTE PROVIDER - CARE PROVIDER_API CALL
Demetrio Montana  Cardiology  [address] [place] [address], [address]  [place] [address]  Phone: [phone]  Fax: [phone]  Scheduled Appointment: 01/02/2024 02:40 PM   Demetrio Montana  Cardiology  [address] [place] [address], [address]  [place] [address]  Phone: [phone]  Fax: [phone]  Scheduled Appointment: 01/02/2024 02:40 PM

## 2023-12-14 NOTE — DISCHARGE NOTE PROVIDER - CARE PROVIDERS DIRECT ADDRESSES
[email or web address]

## 2023-12-18 DIAGNOSIS — I48.19 OTHER PERSISTENT ATRIAL FIBRILLATION: ICD-10-CM

## 2023-12-18 DIAGNOSIS — M81.0 AGE-RELATED OSTEOPOROSIS WITHOUT CURRENT PATHOLOGICAL FRACTURE: ICD-10-CM

## 2023-12-18 DIAGNOSIS — Z85.038 PERSONAL HISTORY OF OTHER MALIGNANT NEOPLASM OF LARGE INTESTINE: ICD-10-CM

## 2023-12-18 DIAGNOSIS — Z79.899 OTHER LONG TERM (CURRENT) DRUG THERAPY: ICD-10-CM

## 2023-12-18 DIAGNOSIS — I11.0 HYPERTENSIVE HEART DISEASE WITH HEART FAILURE: ICD-10-CM

## 2023-12-18 DIAGNOSIS — Z79.01 LONG TERM (CURRENT) USE OF ANTICOAGULANTS: ICD-10-CM

## 2023-12-18 DIAGNOSIS — I50.33 ACUTE ON CHRONIC DIASTOLIC (CONGESTIVE) HEART FAILURE: ICD-10-CM

## 2023-12-18 DIAGNOSIS — E78.5 HYPERLIPIDEMIA, UNSPECIFIED: ICD-10-CM

## 2023-12-18 DIAGNOSIS — Z87.891 PERSONAL HISTORY OF NICOTINE DEPENDENCE: ICD-10-CM

## 2023-12-18 DIAGNOSIS — Z90.49 ACQUIRED ABSENCE OF OTHER SPECIFIED PARTS OF DIGESTIVE TRACT: ICD-10-CM

## 2023-12-18 DIAGNOSIS — R26.0 ATAXIC GAIT: ICD-10-CM

## 2023-12-20 ENCOUNTER — APPOINTMENT (OUTPATIENT)
Dept: HEART AND VASCULAR | Facility: CLINIC | Age: 88
End: 2023-12-20

## 2023-12-26 ENCOUNTER — APPOINTMENT (OUTPATIENT)
Dept: HEART AND VASCULAR | Facility: CLINIC | Age: 88
End: 2023-12-26
Payer: MEDICARE

## 2023-12-26 VITALS
DIASTOLIC BLOOD PRESSURE: 63 MMHG | HEART RATE: 70 BPM | WEIGHT: 91.44 LBS | OXYGEN SATURATION: 93 % | SYSTOLIC BLOOD PRESSURE: 105 MMHG | BODY MASS INDEX: 20.57 KG/M2 | HEIGHT: 56 IN

## 2023-12-26 PROBLEM — I10 ESSENTIAL (PRIMARY) HYPERTENSION: Chronic | Status: ACTIVE | Noted: 2023-12-12

## 2023-12-26 PROCEDURE — 99215 OFFICE O/P EST HI 40 MIN: CPT

## 2023-12-27 RX ORDER — FUROSEMIDE 40 MG/1
40 TABLET ORAL DAILY
Qty: 90 | Refills: 3 | Status: ACTIVE | COMMUNITY
Start: 1900-01-01 | End: 1900-01-01

## 2023-12-27 NOTE — REASON FOR VISIT
[FreeTextEntry1] : ======================================================================= Diagnostic Tests: -------------------------------------------------------------- EC23: atrial fibrillation with SVR, LAD, possible old anteroseptal MI.  23: atrial fibrillation with SVR, LAD, possible old anteroseptal MI.  --------------------------------------------------------------- Echo: 23: EF > 75%, mild LVH, aortic sclerosis, mild AI, mild MR, mild-to-moderate TR. 22: EF 70%, small LV, severe biatrial dilation, aortic sclerosis, mild AI, mild MR, moderate TR.   ---------------------------------------------------------------- Monitors: 2017: 48-hour Holter: HR , average 83bpm, APC's with aberrant conduction. ----------------------------------------------------------------- Stress tests:  16:  MPI: negative regadenoson ECG, adequate hemodynamic response, freq PVC's, normal perfusion (small mild anterior defect due to breast attenuation), EF 75%.

## 2023-12-27 NOTE — HISTORY OF PRESENT ILLNESS
[FreeTextEntry1] : Ms. Merino presents for follow up and management of persistent atrial fibrillation, chronic diastolic heart failure, osteoporosis, chronic anemia, colon cancer (10/13/16), HTN, and dyslipidemia.  She was previously followed by another cardiologist, Daksha Nur MD (who moved).  Her  (who was my patient) passed away on 12/24/22 at the age of 89.  She is coping relatively well since his passing.  On 12/12/23, she was scheduled to see me in the office at 2pm but was evaluated in the ED at [facility] for acute on chronic diastolic heart failure. She was admitted to [facility] and treated with IV diuretics.  On 12/13/23, she had an echocardiogram which revealed an EF of > 75%, mild LVH, aortic sclerosis, mild AI, mild MR, and mild-to-moderate TR.  At present, she feels back to her baseline.

## 2023-12-27 NOTE — ASSESSMENT
[FreeTextEntry1] : ======================================================================================= 1. Atrial fibrillation, persistent: [id number] Score 5:  - continue systemic anticoagulation with Eliquis 2.5 mg po bid (age > 80, body weight < 60 kg)  - discussed with patient avoidance of ASA and NSAIDS as well as need for bleeding surveillance  - will follow with periodic echocardiograms to assess LV function   2. Chronic diastolic heart failure: NYHA II:  - continue Farxiga 10mg po qd (discussed importance of urinary hygiene to prevent perineal infections)  - continue furosemide 40mg po qd prn  - will follow with serial echocardiograms   3. HTN: BP at ACC/AHA 2017 guideline target:  - continue irbesartan 150mg po qd  - continue metoprolol succinate 25mg po qd  4. Dyslipidemia:  - continue atorvastatin 10mg po qd  - discussed therapeutic lifestyle changes to promote improved lipid metabolism  - will check lab work next visit  I spent > 45 minutes of total time on this visit, including time spent face-to-face and non-face-to-face.  During this time, I took a relevant history and examined the patient.  I reviewed relevant portions of the medical record and formulated a differential diagnosis and plan.  I explained the relevant cardiac diagnoses to the patient, as well as the work up and management plan.  I answered all questions related to the patient's cardiac conditions.

## 2024-01-05 ENCOUNTER — EMERGENCY (EMERGENCY)
Facility: HOSPITAL | Age: 89
LOS: 1 days | Discharge: HOME | End: 2024-01-05
Attending: EMERGENCY MEDICINE | Admitting: EMERGENCY MEDICINE
Payer: COMMERCIAL

## 2024-01-05 VITALS
SYSTOLIC BLOOD PRESSURE: 140 MMHG | TEMPERATURE: 99 F | RESPIRATION RATE: 18 BRPM | OXYGEN SATURATION: 91 % | DIASTOLIC BLOOD PRESSURE: 85 MMHG | HEART RATE: 88 BPM

## 2024-01-05 VITALS
SYSTOLIC BLOOD PRESSURE: 115 MMHG | HEART RATE: 60 BPM | WEIGHT: 130.07 LBS | TEMPERATURE: 99 F | HEIGHT: 55 IN | DIASTOLIC BLOOD PRESSURE: 65 MMHG | OXYGEN SATURATION: 97 % | RESPIRATION RATE: 18 BRPM

## 2024-01-05 DIAGNOSIS — Z90.49 ACQUIRED ABSENCE OF OTHER SPECIFIED PARTS OF DIGESTIVE TRACT: Chronic | ICD-10-CM

## 2024-01-05 LAB
ANION GAP SERPL CALC-SCNC: 7 MMOL/L — SIGNIFICANT CHANGE UP (ref 5–17)
ANION GAP SERPL CALC-SCNC: 7 MMOL/L — SIGNIFICANT CHANGE UP (ref 5–17)
BASOPHILS # BLD AUTO: 0.02 K/UL — SIGNIFICANT CHANGE UP (ref 0–0.2)
BASOPHILS # BLD AUTO: 0.02 K/UL — SIGNIFICANT CHANGE UP (ref 0–0.2)
BASOPHILS NFR BLD AUTO: 0.3 % — SIGNIFICANT CHANGE UP (ref 0–2)
BASOPHILS NFR BLD AUTO: 0.3 % — SIGNIFICANT CHANGE UP (ref 0–2)
BUN SERPL-MCNC: 12 MG/DL — SIGNIFICANT CHANGE UP (ref 7–23)
BUN SERPL-MCNC: 12 MG/DL — SIGNIFICANT CHANGE UP (ref 7–23)
CALCIUM SERPL-MCNC: 9.1 MG/DL — SIGNIFICANT CHANGE UP (ref 8.4–10.5)
CALCIUM SERPL-MCNC: 9.1 MG/DL — SIGNIFICANT CHANGE UP (ref 8.4–10.5)
CHLORIDE SERPL-SCNC: 101 MMOL/L — SIGNIFICANT CHANGE UP (ref 96–108)
CHLORIDE SERPL-SCNC: 101 MMOL/L — SIGNIFICANT CHANGE UP (ref 96–108)
CO2 SERPL-SCNC: 30 MMOL/L — SIGNIFICANT CHANGE UP (ref 22–31)
CO2 SERPL-SCNC: 30 MMOL/L — SIGNIFICANT CHANGE UP (ref 22–31)
CREAT SERPL-MCNC: 0.64 MG/DL — SIGNIFICANT CHANGE UP (ref 0.5–1.3)
CREAT SERPL-MCNC: 0.64 MG/DL — SIGNIFICANT CHANGE UP (ref 0.5–1.3)
EGFR: 84 ML/MIN/1.73M2 — SIGNIFICANT CHANGE UP
EGFR: 84 ML/MIN/1.73M2 — SIGNIFICANT CHANGE UP
EOSINOPHIL # BLD AUTO: 0.08 K/UL — SIGNIFICANT CHANGE UP (ref 0–0.5)
EOSINOPHIL # BLD AUTO: 0.08 K/UL — SIGNIFICANT CHANGE UP (ref 0–0.5)
EOSINOPHIL NFR BLD AUTO: 1.2 % — SIGNIFICANT CHANGE UP (ref 0–6)
EOSINOPHIL NFR BLD AUTO: 1.2 % — SIGNIFICANT CHANGE UP (ref 0–6)
GLUCOSE SERPL-MCNC: 84 MG/DL — SIGNIFICANT CHANGE UP (ref 70–99)
GLUCOSE SERPL-MCNC: 84 MG/DL — SIGNIFICANT CHANGE UP (ref 70–99)
HCT VFR BLD CALC: 39.5 % — SIGNIFICANT CHANGE UP (ref 34.5–45)
HCT VFR BLD CALC: 39.5 % — SIGNIFICANT CHANGE UP (ref 34.5–45)
HGB BLD-MCNC: 12.7 G/DL — SIGNIFICANT CHANGE UP (ref 11.5–15.5)
HGB BLD-MCNC: 12.7 G/DL — SIGNIFICANT CHANGE UP (ref 11.5–15.5)
IMM GRANULOCYTES NFR BLD AUTO: 0.5 % — SIGNIFICANT CHANGE UP (ref 0–0.9)
IMM GRANULOCYTES NFR BLD AUTO: 0.5 % — SIGNIFICANT CHANGE UP (ref 0–0.9)
LYMPHOCYTES # BLD AUTO: 1.47 K/UL — SIGNIFICANT CHANGE UP (ref 1–3.3)
LYMPHOCYTES # BLD AUTO: 1.47 K/UL — SIGNIFICANT CHANGE UP (ref 1–3.3)
LYMPHOCYTES # BLD AUTO: 22.9 % — SIGNIFICANT CHANGE UP (ref 13–44)
LYMPHOCYTES # BLD AUTO: 22.9 % — SIGNIFICANT CHANGE UP (ref 13–44)
MAGNESIUM SERPL-MCNC: 2 MG/DL — SIGNIFICANT CHANGE UP (ref 1.6–2.6)
MAGNESIUM SERPL-MCNC: 2 MG/DL — SIGNIFICANT CHANGE UP (ref 1.6–2.6)
MCHC RBC-ENTMCNC: 29.9 PG — SIGNIFICANT CHANGE UP (ref 27–34)
MCHC RBC-ENTMCNC: 29.9 PG — SIGNIFICANT CHANGE UP (ref 27–34)
MCHC RBC-ENTMCNC: 32.2 GM/DL — SIGNIFICANT CHANGE UP (ref 32–36)
MCHC RBC-ENTMCNC: 32.2 GM/DL — SIGNIFICANT CHANGE UP (ref 32–36)
MCV RBC AUTO: 92.9 FL — SIGNIFICANT CHANGE UP (ref 80–100)
MCV RBC AUTO: 92.9 FL — SIGNIFICANT CHANGE UP (ref 80–100)
MONOCYTES # BLD AUTO: 0.55 K/UL — SIGNIFICANT CHANGE UP (ref 0–0.9)
MONOCYTES # BLD AUTO: 0.55 K/UL — SIGNIFICANT CHANGE UP (ref 0–0.9)
MONOCYTES NFR BLD AUTO: 8.6 % — SIGNIFICANT CHANGE UP (ref 2–14)
MONOCYTES NFR BLD AUTO: 8.6 % — SIGNIFICANT CHANGE UP (ref 2–14)
NEUTROPHILS # BLD AUTO: 4.27 K/UL — SIGNIFICANT CHANGE UP (ref 1.8–7.4)
NEUTROPHILS # BLD AUTO: 4.27 K/UL — SIGNIFICANT CHANGE UP (ref 1.8–7.4)
NEUTROPHILS NFR BLD AUTO: 66.5 % — SIGNIFICANT CHANGE UP (ref 43–77)
NEUTROPHILS NFR BLD AUTO: 66.5 % — SIGNIFICANT CHANGE UP (ref 43–77)
NRBC # BLD: 0 /100 WBCS — SIGNIFICANT CHANGE UP (ref 0–0)
NRBC # BLD: 0 /100 WBCS — SIGNIFICANT CHANGE UP (ref 0–0)
NT-PROBNP SERPL-SCNC: 4180 PG/ML — HIGH (ref 0–300)
NT-PROBNP SERPL-SCNC: 4180 PG/ML — HIGH (ref 0–300)
PLATELET # BLD AUTO: 195 K/UL — SIGNIFICANT CHANGE UP (ref 150–400)
PLATELET # BLD AUTO: 195 K/UL — SIGNIFICANT CHANGE UP (ref 150–400)
POTASSIUM SERPL-MCNC: 3.9 MMOL/L — SIGNIFICANT CHANGE UP (ref 3.5–5.3)
POTASSIUM SERPL-MCNC: 3.9 MMOL/L — SIGNIFICANT CHANGE UP (ref 3.5–5.3)
POTASSIUM SERPL-SCNC: 3.9 MMOL/L — SIGNIFICANT CHANGE UP (ref 3.5–5.3)
POTASSIUM SERPL-SCNC: 3.9 MMOL/L — SIGNIFICANT CHANGE UP (ref 3.5–5.3)
RAPID RVP RESULT: DETECTED
RAPID RVP RESULT: DETECTED
RBC # BLD: 4.25 M/UL — SIGNIFICANT CHANGE UP (ref 3.8–5.2)
RBC # BLD: 4.25 M/UL — SIGNIFICANT CHANGE UP (ref 3.8–5.2)
RBC # FLD: 13.7 % — SIGNIFICANT CHANGE UP (ref 10.3–14.5)
RBC # FLD: 13.7 % — SIGNIFICANT CHANGE UP (ref 10.3–14.5)
SARS-COV-2 RNA SPEC QL NAA+PROBE: DETECTED
SARS-COV-2 RNA SPEC QL NAA+PROBE: DETECTED
SODIUM SERPL-SCNC: 138 MMOL/L — SIGNIFICANT CHANGE UP (ref 135–145)
SODIUM SERPL-SCNC: 138 MMOL/L — SIGNIFICANT CHANGE UP (ref 135–145)
TROPONIN T, HIGH SENSITIVITY RESULT: 35 NG/L — SIGNIFICANT CHANGE UP (ref 0–51)
TROPONIN T, HIGH SENSITIVITY RESULT: 35 NG/L — SIGNIFICANT CHANGE UP (ref 0–51)
WBC # BLD: 6.42 K/UL — SIGNIFICANT CHANGE UP (ref 3.8–10.5)
WBC # BLD: 6.42 K/UL — SIGNIFICANT CHANGE UP (ref 3.8–10.5)
WBC # FLD AUTO: 6.42 K/UL — SIGNIFICANT CHANGE UP (ref 3.8–10.5)
WBC # FLD AUTO: 6.42 K/UL — SIGNIFICANT CHANGE UP (ref 3.8–10.5)

## 2024-01-05 PROCEDURE — 99284 EMERGENCY DEPT VISIT MOD MDM: CPT | Mod: 25

## 2024-01-05 PROCEDURE — 83880 ASSAY OF NATRIURETIC PEPTIDE: CPT

## 2024-01-05 PROCEDURE — 71045 X-RAY EXAM CHEST 1 VIEW: CPT | Mod: 26

## 2024-01-05 PROCEDURE — 85025 COMPLETE CBC W/AUTO DIFF WBC: CPT

## 2024-01-05 PROCEDURE — 84484 ASSAY OF TROPONIN QUANT: CPT

## 2024-01-05 PROCEDURE — 36415 COLL VENOUS BLD VENIPUNCTURE: CPT

## 2024-01-05 PROCEDURE — 80048 BASIC METABOLIC PNL TOTAL CA: CPT

## 2024-01-05 PROCEDURE — 0225U NFCT DS DNA&RNA 21 SARSCOV2: CPT

## 2024-01-05 PROCEDURE — 71045 X-RAY EXAM CHEST 1 VIEW: CPT

## 2024-01-05 PROCEDURE — 99285 EMERGENCY DEPT VISIT HI MDM: CPT

## 2024-01-05 PROCEDURE — 96374 THER/PROPH/DIAG INJ IV PUSH: CPT

## 2024-01-05 PROCEDURE — 96376 TX/PRO/DX INJ SAME DRUG ADON: CPT

## 2024-01-05 PROCEDURE — 83735 ASSAY OF MAGNESIUM: CPT

## 2024-01-05 RX ORDER — FUROSEMIDE 40 MG
20 TABLET ORAL ONCE
Refills: 0 | Status: COMPLETED | OUTPATIENT
Start: 2024-01-05 | End: 2024-01-05

## 2024-01-05 RX ADMIN — Medication 20 MILLIGRAM(S): at 18:16

## 2024-01-05 RX ADMIN — Medication 20 MILLIGRAM(S): at 16:41

## 2024-01-05 NOTE — ED PROVIDER NOTE - OBJECTIVE STATEMENT
Appointment scheduled    former smoker, with PMHx of PERSISTENT A.fib (on Eliquis), HFpEF, HTN, HLD, osteoporosis, anemia, colon cancer (s/p partial colectomy 10/13/16 ), and SCC, here with former smoker, with PMHx of PERSISTENT A.fib (on Eliquis), HFpEF, HTN, HLD, osteoporosis, anemia, colon cancer (s/p partial colectomy 10/13/16 ), and SCC, here with shortness of breath. Reports she was feeling fatigued over the weekend, tested positive for covid 4 days ago. Mild cough. Denies fever, chills, chest pain. Notes some increased lower extremity edema. Has been taking lasix but dose was decreased to every other day. Reports retesting for covid and negative today.

## 2024-01-05 NOTE — ED PROVIDER NOTE - DISCHARGE DATE
Pt called back, asked what the name of his procedure is called, I told him 1st mpj cheilectomy --patient had no further questions.   05-Jan-2024

## 2024-01-05 NOTE — ED ADULT TRIAGE NOTE - CHIEF COMPLAINT QUOTE
Pt aaox3 c/o worsening SOB and cough. Pt tested covid + on Monday, and tested negative today. pt has pmhx of CHF, a fib, colon CA, HTN.

## 2024-01-05 NOTE — ED PROVIDER NOTE - NSICDXPASTMEDICALHX_GEN_ALL_CORE_FT
PAST MEDICAL HISTORY:  Acute CHF     Afib     Anemia     Colon cancer     HLD (hyperlipidemia)     HTN (hypertension)     Osteoporosis     Skin cancer     Squamous cell skin cancer

## 2024-01-05 NOTE — ED ADULT NURSE NOTE - NSFALLUNIVINTERV_ED_ALL_ED
Bed/Stretcher in lowest position, wheels locked, appropriate side rails in place/Call bell, personal items and telephone in reach/Instruct patient to call for assistance before getting out of bed/chair/stretcher/Non-slip footwear applied when patient is off stretcher/[place] to call system/Physically safe environment - no spills, clutter or unnecessary equipment/Purposeful proactive rounding/Room/bathroom lighting operational, light cord in reach Bed/Stretcher in lowest position, wheels locked, appropriate side rails in place/Call bell, personal items and telephone in reach/Instruct patient to call for assistance before getting out of bed/chair/stretcher/Non-slip footwear applied when patient is off stretcher/[place] to call system/Physically safe environment - no spills, clutter or unnecessary equipment/Purposeful proactive rounding/Room/bathroom lighting operational, light cord in reach

## 2024-01-05 NOTE — ED PROVIDER NOTE - PATIENT PORTAL LINK FT
You can access the FollowMyHealth Patient Portal offered by [facility] by registering at the following website: [email or web address]. By joining [employer]’s FollowMyHealth portal, you will also be able to view your health information using other applications (apps) compatible with our system. You can access the FollowMyHealth Patient Portal offered by [facility] by registering at the following website: [email or web address]. By joining [employer]’s FollowMyHealth portal, you will also be able to view your health information using other applications (apps) compatible with our system.

## 2024-01-05 NOTE — ED PROVIDER NOTE - CLINICAL SUMMARY MEDICAL DECISION MAKING FREE TEXT BOX
here with increased sob past few days. recent covid + at home. appears volume overloaded clinically w peripheral edema/ congestion. labs/cxr/ekg ordered. given lasix 20mg iv. made 400ml urine. discussed desire to admit for further diuresis but refusing. discussed w her cardiologist, see progress note. will give additional lasix in ed. dc with 40mg daily. f/u in office next week. return precautions discussed here with increased sob past few days. recent covid + at home. appears volume overloaded clinically w peripheral edema/ congestion. also suspect component of covid infection. labs/cxr/ekg ordered. given lasix 20mg iv. made 400ml urine. discussed desire to admit for further diuresis but refusing. discussed w her cardiologist, see progress note. will give additional lasix in ed. dc with 40mg daily. f/u in office next week. return precautions discussed

## 2024-01-05 NOTE — ED ADULT NURSE NOTE - OBJECTIVE STATEMENT
Pt arrived to ED c/o sob and cough. Pt reports COVID + test on monday. Pt did a repeat test today and it was negative. Pt still c/o sob and cough. Denies cp, n/v/d, f/v.  pmhx of CHF, a fib, colon CA, HTN. HHA at bedside with pt

## 2024-01-05 NOTE — ED PROVIDER NOTE - PROGRESS NOTE DETAILS
would like to admit for diuresis/ chf exacerbation but pt refusing. discussed case with Dr. Finkelstein. rec additional dose lasix in the ed. can dc w daily 40mg lasix and see in office next week made 900 ml urine

## 2024-01-05 NOTE — ED PROVIDER NOTE - CARE PLAN
Principal Discharge DX:	Decompensated heart failure   1 Principal Discharge DX:	Decompensated heart failure  Secondary Diagnosis:	2019 novel coronavirus disease (COVID-19)

## 2024-01-05 NOTE — ED PROVIDER NOTE - NSFOLLOWUPINSTRUCTIONS_ED_ALL_ED_FT
Take lasix 40mg daily until you see your cardiologist next week.  Call for appointment.  If you have any problems with followup, please call the ED Referral Coordinator at [phone].  Return to the ER if symptoms worsen or other concerns.    Heart Failure Exacerbation  Heart failure is a condition in which the heart has trouble pumping blood. This may mean that the heart cannot pump enough blood out to the body or that the heart does not fill up with enough blood. When this happens, parts of the body do not get the blood and oxygen they need to function properly. This can cause symptoms such as breathing problems, tiredness (fatigue), swelling, and confusion.    Heart failure exacerbation refers to heart failure symptoms that get worse. The symptoms may get worse suddenly or develop slowly over time. Heart failure exacerbation is a serious medical problem that should be treated right away.    What are the causes?  A heart failure exacerbation can be triggered by:  Not taking your heart failure medicines correctly.  Infections.  Eating an unhealthy diet or a diet that is high in salt (sodium).  Drinking too much fluid.  Drinking alcohol.  Using drugs, such as cocaine or methamphetamine.  Not exercising.  Other causes include:  Other heart conditions such as an irregular heart rhythm (arrhythmia).  Worsening heart valve function.  Low blood counts (anemia).  Other medical problems, such as kidney failure, thyroid problems, or diabetes mellitus.  Sometimes the cause of the exacerbation is not known.    What are the signs or symptoms?  When heart failure symptoms suddenly or slowly get worse, this may be a sign of heart failure exacerbation. Symptoms of heart failure include:  Shortness of breath during activity or exercise.  A cough that does not go away.  Swelling of the legs, ankles, feet, or abdomen.  Losing or gaining weight for no reason.  Trouble breathing when lying down.  Increased heart rate or irregular heartbeat.  Fatigue.  Feeling light-headed, dizzy, or close to fainting.  Nausea or lack of appetite.  How is this diagnosed?  This condition is diagnosed based on:  Your symptoms and medical history.  A physical exam.  You may also have tests, including:  Electrocardiogram (ECG). This test measures the electrical activity of your heart.  Echocardiogram. This test uses sound waves to take a picture of your heart to see how well it works.  Blood tests.  Imaging tests, such as:  Chest X-ray.  MRI.  Ultrasound.  Stress test. This test examines how well your heart functions while you exercise on a treadmill or exercise bike. If you cannot exercise, medicines may be used to increase your heartbeat in place of exercise.  Cardiac catheterization. During this test, a thin, flexible tube (catheter) is inserted into a blood vessel and threaded up to your heart. This test allows your health care provider to check the arteries that lead to your heart (coronary arteries).  Right heart catheterization. During this test, the pressure in your heart is measured.  How is this treated?  This condition may be treated by:  Adjusting your heart medicines.  Maintaining a healthy lifestyle. This includes:  Eating a heart-healthy diet that is low in sodium.  Not using products that contain nicotine or tobacco.  Regular exercise.  Monitoring your fluid intake.  Monitoring your weight and reporting changes to your health care provider.  Not using alcohol or drugs.  Treating sleep apnea, if you have this condition.  Surgery. This may include:  Placing a pacemaker to improve heart function (cardiac resynchronization therapy).  Implanting a device that can correct heart rhythm problems (implantable cardioverter defibrillator).  Implanting a pulmonary arterial pressure monitor to monitor your fluid balance.  Connecting a device to your heart to help it pump blood (ventricular assist device).  Heart transplant.  Follow these instructions at home:  Medicines    Take over-the-counter and prescription medicines only as told by your health care provider.  Do not stop taking your medicines or change the amount you take. If you are having problems or side effects from your medicines, talk to your health care provider.  If you are having difficulty paying for your medicines, contact a  or your clinic. There are many programs to assist with medicine costs.  Talk to your health care provider before starting any new medicines or supplements.  Make sure your health care provider and pharmacist have a list of all the medicines you are taking.  Eating and drinking    A plate with examples of foods in a healthy diet.  Avoid drinking alcohol.  Eat a heart-healthy diet as told by your health care provider. This includes:  Plenty of fruits and vegetables.  Lean proteins.  Low-fat dairy.  Whole grains.  Foods that are low in sodium.  Activity    Illustration of couple holding hands and walking.  Exercise regularly as told by your health care provider. Balance exercise with rest.  Ask your health care provider what activities are safe for you. This includes sexual activity, exercise, and daily tasks at home or work.  Lifestyle    Do not use any products that contain nicotine or tobacco. These products include cigarettes, chewing tobacco, and vaping devices, such as e-cigarettes. If you need help quitting, ask your health care provider.  Maintain a healthy weight. Ask your health care provider what weight is healthy for you.  Consider joining a patient support group. This can help with emotional problems you may have, such as stress and anxiety.  Do not use drugs.  General instructions    Stay up to date with vaccines. Talk to your health care provider about flu and pneumonia vaccines.  Keep a list of medicines that you are taking. This may help in emergency situations.  Keep all follow-up visits. This is important.  Contact a health care provider if:  You have questions about your medicines or you miss a dose.  You feel anxious, depressed, or stressed.  You develop swelling in your feet, ankles, legs, or abdomen.  You develop a cough.  You have a fever.  You have trouble sleeping.  You gain 2–3 lb (1–1.4 kg) in 24 hours or 5 lb (2.3 kg) in a week.  Get help right away if:  You have chest pain or pressure.  You have shortness of breath while resting.  You have severe fatigue.  You are confused.  You have severe dizziness.  You have a rapid or irregular heartbeat.  You have nausea or you vomit.  You have a cough that is worse at night or you cannot lie flat.  You have severe depression or sadness.  These symptoms may represent a serious problem that is an emergency. Do not wait to see if the symptoms will go away. Get medical help right away. Call your local emergency services (911 in the [place] Do not drive yourself to the hospital.    Summary  When heart failure symptoms get worse, it is called heart failure exacerbation.  Common causes of this condition include taking medicines incorrectly, infections, and drinking alcohol.  This condition may be treated by adjusting medicines, maintaining a healthy lifestyle, or surgery.  Do not stop taking your medicines or change the amount you take. If you are having problems or side effects from your medicines, talk to your health care provider.  This information is not intended to replace advice given to you by your health care provider. Make sure you discuss any questions you have with your health care provider. Take lasix 40mg daily until you see your cardiologist next week.  Call for appointment.  If you have any problems with followup, please call the ED Referral Coordinator at [phone].  Return to the ER if symptoms worsen or other concerns.    Heart Failure Exacerbation  Heart failure is a condition in which the heart has trouble pumping blood. This may mean that the heart cannot pump enough blood out to the body or that the heart does not fill up with enough blood. When this happens, parts of the body do not get the blood and oxygen they need to function properly. This can cause symptoms such as breathing problems, tiredness (fatigue), swelling, and confusion.    Heart failure exacerbation refers to heart failure symptoms that get worse. The symptoms may get worse suddenly or develop slowly over time. Heart failure exacerbation is a serious medical problem that should be treated right away.    What are the causes?  A heart failure exacerbation can be triggered by:  Not taking your heart failure medicines correctly.  Infections.  Eating an unhealthy diet or a diet that is high in salt (sodium).  Drinking too much fluid.  Drinking alcohol.  Using drugs, such as cocaine or methamphetamine.  Not exercising.  Other causes include:  Other heart conditions such as an irregular heart rhythm (arrhythmia).  Worsening heart valve function.  Low blood counts (anemia).  Other medical problems, such as kidney failure, thyroid problems, or diabetes mellitus.  Sometimes the cause of the exacerbation is not known.    What are the signs or symptoms?  When heart failure symptoms suddenly or slowly get worse, this may be a sign of heart failure exacerbation. Symptoms of heart failure include:  Shortness of breath during activity or exercise.  A cough that does not go away.  Swelling of the legs, ankles, feet, or abdomen.  Losing or gaining weight for no reason.  Trouble breathing when lying down.  Increased heart rate or irregular heartbeat.  Fatigue.  Feeling light-headed, dizzy, or close to fainting.  Nausea or lack of appetite.  How is this diagnosed?  This condition is diagnosed based on:  Your symptoms and medical history.  A physical exam.  You may also have tests, including:  Electrocardiogram (ECG). This test measures the electrical activity of your heart.  Echocardiogram. This test uses sound waves to take a picture of your heart to see how well it works.  Blood tests.  Imaging tests, such as:  Chest X-ray.  MRI.  Ultrasound.  Stress test. This test examines how well your heart functions while you exercise on a treadmill or exercise bike. If you cannot exercise, medicines may be used to increase your heartbeat in place of exercise.  Cardiac catheterization. During this test, a thin, flexible tube (catheter) is inserted into a blood vessel and threaded up to your heart. This test allows your health care provider to check the arteries that lead to your heart (coronary arteries).  Right heart catheterization. During this test, the pressure in your heart is measured.  How is this treated?  This condition may be treated by:  Adjusting your heart medicines.  Maintaining a healthy lifestyle. This includes:  Eating a heart-healthy diet that is low in sodium.  Not using products that contain nicotine or tobacco.  Regular exercise.  Monitoring your fluid intake.  Monitoring your weight and reporting changes to your health care provider.  Not using alcohol or drugs.  Treating sleep apnea, if you have this condition.  Surgery. This may include:  Placing a pacemaker to improve heart function (cardiac resynchronization therapy).  Implanting a device that can correct heart rhythm problems (implantable cardioverter defibrillator).  Implanting a pulmonary arterial pressure monitor to monitor your fluid balance.  Connecting a device to your heart to help it pump blood (ventricular assist device).  Heart transplant.  Follow these instructions at home:  Medicines    Take over-the-counter and prescription medicines only as told by your health care provider.  Do not stop taking your medicines or change the amount you take. If you are having problems or side effects from your medicines, talk to your health care provider.  If you are having difficulty paying for your medicines, contact a  or your clinic. There are many programs to assist with medicine costs.  Talk to your health care provider before starting any new medicines or supplements.  Make sure your health care provider and pharmacist have a list of all the medicines you are taking.  Eating and drinking    A plate with examples of foods in a healthy diet.  Avoid drinking alcohol.  Eat a heart-healthy diet as told by your health care provider. This includes:  Plenty of fruits and vegetables.  Lean proteins.  Low-fat dairy.  Whole grains.  Foods that are low in sodium.  Activity    Illustration of couple holding hands and walking.  Exercise regularly as told by your health care provider. Balance exercise with rest.  Ask your health care provider what activities are safe for you. This includes sexual activity, exercise, and daily tasks at home or work.  Lifestyle    Do not use any products that contain nicotine or tobacco. These products include cigarettes, chewing tobacco, and vaping devices, such as e-cigarettes. If you need help quitting, ask your health care provider.  Maintain a healthy weight. Ask your health care provider what weight is healthy for you.  Consider joining a patient support group. This can help with emotional problems you may have, such as stress and anxiety.  Do not use drugs.  General instructions    Stay up to date with vaccines. Talk to your health care provider about flu and pneumonia vaccines.  Keep a list of medicines that you are taking. This may help in emergency situations.  Keep all follow-up visits. This is important.  Contact a health care provider if:  You have questions about your medicines or you miss a dose.  You feel anxious, depressed, or stressed.  You develop swelling in your feet, ankles, legs, or abdomen.  You develop a cough.  You have a fever.  You have trouble sleeping.  You gain 2–3 lb (1–1.4 kg) in 24 hours or 5 lb (2.3 kg) in a week.  Get help right away if:  You have chest pain or pressure.  You have shortness of breath while resting.  You have severe fatigue.  You are confused.  You have severe dizziness.  You have a rapid or irregular heartbeat.  You have nausea or you vomit.  You have a cough that is worse at night or you cannot lie flat.  You have severe depression or sadness.  These symptoms may represent a serious problem that is an emergency. Do not wait to see if the symptoms will go away. Get medical help right away. Call your local emergency services (911 in the [place] Do not drive yourself to the hospital.    Summary  When heart failure symptoms get worse, it is called heart failure exacerbation.  Common causes of this condition include taking medicines incorrectly, infections, and drinking alcohol.  This condition may be treated by adjusting medicines, maintaining a healthy lifestyle, or surgery.  Do not stop taking your medicines or change the amount you take. If you are having problems or side effects from your medicines, talk to your health care provider.  This information is not intended to replace advice given to you by your health care provider. Make sure you discuss any questions you have with your health care provider.

## 2024-01-09 ENCOUNTER — APPOINTMENT (OUTPATIENT)
Dept: HEART AND VASCULAR | Facility: CLINIC | Age: 89
End: 2024-01-09
Payer: MEDICARE

## 2024-01-09 VITALS
BODY MASS INDEX: 18.29 KG/M2 | HEIGHT: 56 IN | DIASTOLIC BLOOD PRESSURE: 63 MMHG | WEIGHT: 81.31 LBS | SYSTOLIC BLOOD PRESSURE: 97 MMHG | OXYGEN SATURATION: 92 % | HEART RATE: 80 BPM | TEMPERATURE: 97.2 F

## 2024-01-09 VITALS — HEART RATE: 65 BPM | DIASTOLIC BLOOD PRESSURE: 69 MMHG | SYSTOLIC BLOOD PRESSURE: 103 MMHG

## 2024-01-09 DIAGNOSIS — M81.0 AGE-RELATED OSTEOPOROSIS WITHOUT CURRENT PATHOLOGICAL FRACTURE: ICD-10-CM

## 2024-01-09 DIAGNOSIS — Z87.891 PERSONAL HISTORY OF NICOTINE DEPENDENCE: ICD-10-CM

## 2024-01-09 DIAGNOSIS — E78.5 HYPERLIPIDEMIA, UNSPECIFIED: ICD-10-CM

## 2024-01-09 DIAGNOSIS — I48.91 UNSPECIFIED ATRIAL FIBRILLATION: ICD-10-CM

## 2024-01-09 DIAGNOSIS — I50.9 HEART FAILURE, UNSPECIFIED: ICD-10-CM

## 2024-01-09 DIAGNOSIS — Z79.01 LONG TERM (CURRENT) USE OF ANTICOAGULANTS: ICD-10-CM

## 2024-01-09 DIAGNOSIS — U07.1 COVID-19: ICD-10-CM

## 2024-01-09 DIAGNOSIS — Z86.2 PERSONAL HISTORY OF DISEASES OF THE BLOOD AND BLOOD-FORMING ORGANS AND CERTAIN DISORDERS INVOLVING THE IMMUNE MECHANISM: ICD-10-CM

## 2024-01-09 DIAGNOSIS — Z85.038 PERSONAL HISTORY OF OTHER MALIGNANT NEOPLASM OF LARGE INTESTINE: ICD-10-CM

## 2024-01-09 DIAGNOSIS — I11.0 HYPERTENSIVE HEART DISEASE WITH HEART FAILURE: ICD-10-CM

## 2024-01-09 DIAGNOSIS — R06.02 SHORTNESS OF BREATH: ICD-10-CM

## 2024-01-09 DIAGNOSIS — Z90.49 ACQUIRED ABSENCE OF OTHER SPECIFIED PARTS OF DIGESTIVE TRACT: ICD-10-CM

## 2024-01-09 PROCEDURE — 99215 OFFICE O/P EST HI 40 MIN: CPT

## 2024-01-09 PROCEDURE — G2211 COMPLEX E/M VISIT ADD ON: CPT

## 2024-01-17 ENCOUNTER — EMERGENCY (EMERGENCY)
Facility: HOSPITAL | Age: 89
LOS: 1 days | Discharge: HOME | End: 2024-01-17
Attending: EMERGENCY MEDICINE | Admitting: EMERGENCY MEDICINE
Payer: MEDICARE

## 2024-01-17 VITALS
HEIGHT: 55 IN | HEART RATE: 76 BPM | OXYGEN SATURATION: 95 % | TEMPERATURE: 98 F | SYSTOLIC BLOOD PRESSURE: 114 MMHG | DIASTOLIC BLOOD PRESSURE: 78 MMHG | RESPIRATION RATE: 18 BRPM

## 2024-01-17 VITALS
HEART RATE: 81 BPM | OXYGEN SATURATION: 93 % | DIASTOLIC BLOOD PRESSURE: 68 MMHG | TEMPERATURE: 98 F | SYSTOLIC BLOOD PRESSURE: 112 MMHG | RESPIRATION RATE: 17 BRPM

## 2024-01-17 DIAGNOSIS — I48.91 UNSPECIFIED ATRIAL FIBRILLATION: ICD-10-CM

## 2024-01-17 DIAGNOSIS — R41.0 DISORIENTATION, UNSPECIFIED: ICD-10-CM

## 2024-01-17 DIAGNOSIS — Z20.822 CONTACT WITH AND (SUSPECTED) EXPOSURE TO COVID-19: ICD-10-CM

## 2024-01-17 DIAGNOSIS — Z90.49 ACQUIRED ABSENCE OF OTHER SPECIFIED PARTS OF DIGESTIVE TRACT: Chronic | ICD-10-CM

## 2024-01-17 DIAGNOSIS — R06.02 SHORTNESS OF BREATH: ICD-10-CM

## 2024-01-17 DIAGNOSIS — R79.89 OTHER SPECIFIED ABNORMAL FINDINGS OF BLOOD CHEMISTRY: ICD-10-CM

## 2024-01-17 LAB
ALBUMIN SERPL ELPH-MCNC: 3.2 G/DL — LOW (ref 3.4–5)
ALP SERPL-CCNC: 185 U/L — HIGH (ref 40–120)
ALT FLD-CCNC: 34 U/L — SIGNIFICANT CHANGE UP (ref 12–42)
ANION GAP SERPL CALC-SCNC: 4 MMOL/L — LOW (ref 9–16)
APPEARANCE UR: CLEAR — SIGNIFICANT CHANGE UP
AST SERPL-CCNC: 29 U/L — SIGNIFICANT CHANGE UP (ref 15–37)
BACTERIA # UR AUTO: ABNORMAL /HPF
BASOPHILS # BLD AUTO: 0.03 K/UL — SIGNIFICANT CHANGE UP (ref 0–0.2)
BASOPHILS NFR BLD AUTO: 0.3 % — SIGNIFICANT CHANGE UP (ref 0–2)
BILIRUB SERPL-MCNC: 0.9 MG/DL — SIGNIFICANT CHANGE UP (ref 0.2–1.2)
BILIRUB UR-MCNC: NEGATIVE — SIGNIFICANT CHANGE UP
BUN SERPL-MCNC: 19 MG/DL — SIGNIFICANT CHANGE UP (ref 7–23)
CALCIUM SERPL-MCNC: 9.8 MG/DL — SIGNIFICANT CHANGE UP (ref 8.5–10.5)
CHLORIDE SERPL-SCNC: 104 MMOL/L — SIGNIFICANT CHANGE UP (ref 96–108)
CO2 SERPL-SCNC: 36 MMOL/L — HIGH (ref 22–31)
COLOR SPEC: YELLOW — SIGNIFICANT CHANGE UP
CREAT SERPL-MCNC: 0.94 MG/DL — SIGNIFICANT CHANGE UP (ref 0.5–1.3)
DIFF PNL FLD: ABNORMAL
EGFR: 58 ML/MIN/1.73M2 — LOW
EOSINOPHIL # BLD AUTO: 0.09 K/UL — SIGNIFICANT CHANGE UP (ref 0–0.5)
EOSINOPHIL NFR BLD AUTO: 1 % — SIGNIFICANT CHANGE UP (ref 0–6)
EPI CELLS # UR: 2 — SIGNIFICANT CHANGE UP
FLUAV AG NPH QL: SIGNIFICANT CHANGE UP
FLUBV AG NPH QL: SIGNIFICANT CHANGE UP
GLUCOSE SERPL-MCNC: 98 MG/DL — SIGNIFICANT CHANGE UP (ref 70–99)
GLUCOSE UR QL: 100 MG/DL
HCT VFR BLD CALC: 44.7 % — SIGNIFICANT CHANGE UP (ref 34.5–45)
HGB BLD-MCNC: 14.3 G/DL — SIGNIFICANT CHANGE UP (ref 11.5–15.5)
HYALINE CASTS # UR AUTO: PRESENT
IMM GRANULOCYTES NFR BLD AUTO: 0.2 % — SIGNIFICANT CHANGE UP (ref 0–0.9)
KETONES UR-MCNC: NEGATIVE MG/DL — SIGNIFICANT CHANGE UP
LEUKOCYTE ESTERASE UR-ACNC: ABNORMAL
LYMPHOCYTES # BLD AUTO: 1.6 K/UL — SIGNIFICANT CHANGE UP (ref 1–3.3)
LYMPHOCYTES # BLD AUTO: 17.4 % — SIGNIFICANT CHANGE UP (ref 13–44)
MCHC RBC-ENTMCNC: 30.4 PG — SIGNIFICANT CHANGE UP (ref 27–34)
MCHC RBC-ENTMCNC: 32 GM/DL — SIGNIFICANT CHANGE UP (ref 32–36)
MCV RBC AUTO: 95.1 FL — SIGNIFICANT CHANGE UP (ref 80–100)
MONOCYTES # BLD AUTO: 0.66 K/UL — SIGNIFICANT CHANGE UP (ref 0–0.9)
MONOCYTES NFR BLD AUTO: 7.2 % — SIGNIFICANT CHANGE UP (ref 2–14)
NEUTROPHILS # BLD AUTO: 6.81 K/UL — SIGNIFICANT CHANGE UP (ref 1.8–7.4)
NEUTROPHILS NFR BLD AUTO: 73.9 % — SIGNIFICANT CHANGE UP (ref 43–77)
NITRITE UR-MCNC: NEGATIVE — SIGNIFICANT CHANGE UP
NRBC # BLD: 0 /100 WBCS — SIGNIFICANT CHANGE UP (ref 0–0)
NT-PROBNP SERPL-SCNC: 2068 PG/ML — HIGH
PH UR: 6 — SIGNIFICANT CHANGE UP (ref 5–8)
PLATELET # BLD AUTO: 190 K/UL — SIGNIFICANT CHANGE UP (ref 150–400)
POTASSIUM SERPL-MCNC: 3.9 MMOL/L — SIGNIFICANT CHANGE UP (ref 3.5–5.3)
POTASSIUM SERPL-SCNC: 3.9 MMOL/L — SIGNIFICANT CHANGE UP (ref 3.5–5.3)
PROT SERPL-MCNC: 7.2 G/DL — SIGNIFICANT CHANGE UP (ref 6.4–8.2)
PROT UR-MCNC: NEGATIVE MG/DL — SIGNIFICANT CHANGE UP
RBC # BLD: 4.7 M/UL — SIGNIFICANT CHANGE UP (ref 3.8–5.2)
RBC # FLD: 14.6 % — HIGH (ref 10.3–14.5)
RBC CASTS # UR COMP ASSIST: 10 /HPF — HIGH (ref 0–4)
SARS-COV-2 RNA SPEC QL NAA+PROBE: SIGNIFICANT CHANGE UP
SODIUM SERPL-SCNC: 144 MMOL/L — SIGNIFICANT CHANGE UP (ref 132–145)
SP GR SPEC: 1.02 — SIGNIFICANT CHANGE UP (ref 1–1.03)
TROPONIN I, HIGH SENSITIVITY RESULT: 16.9 NG/L — SIGNIFICANT CHANGE UP
UROBILINOGEN FLD QL: 1 MG/DL — SIGNIFICANT CHANGE UP (ref 0.2–1)
WBC # BLD: 9.21 K/UL — SIGNIFICANT CHANGE UP (ref 3.8–10.5)
WBC # FLD AUTO: 9.21 K/UL — SIGNIFICANT CHANGE UP (ref 3.8–10.5)
WBC UR QL: 4 /HPF — SIGNIFICANT CHANGE UP (ref 0–5)

## 2024-01-17 PROCEDURE — 71045 X-RAY EXAM CHEST 1 VIEW: CPT | Mod: 26

## 2024-01-17 PROCEDURE — 99285 EMERGENCY DEPT VISIT HI MDM: CPT

## 2024-01-17 PROCEDURE — 70450 CT HEAD/BRAIN W/O DYE: CPT | Mod: 26

## 2024-01-17 RX ORDER — FUROSEMIDE 40 MG
40 TABLET ORAL ONCE
Refills: 0 | Status: COMPLETED | OUTPATIENT
Start: 2024-01-17 | End: 2024-01-17

## 2024-01-17 RX ADMIN — Medication 40 MILLIGRAM(S): at 22:18

## 2024-01-17 NOTE — ED PROVIDER NOTE - ATTENDING APP SHARED VISIT CONTRIBUTION OF CARE
I performed the initial face to face bedside interview with this patient regarding history of present illness, review of symptoms and past medical, social and family history.  I completed an independent physical examination.  I have signed out the follow up of any pending tests (i.e. labs, radiological studies) to the PA/NP.  I have discussed the patient’s plan of care and disposition with the PA/NP     HPI, ROS, PE, MDM documented by attending physician.

## 2024-01-17 NOTE — ED ADULT NURSE NOTE - OBJECTIVE STATEMENT
Pt presents to ed for confusion as per pt family. Pt AOX4. Hx afib, HTN. +eloquis. Pt reports that she has hx of water retention and takes furosemide. No pain. Pt reports that she has been "extremely spacey" and does not feel like herself. Pt was positive for covid 2 weeks prior. No travel reported. 20grac initiated and ;abs sent. Pt in bed, plan of care ongoing

## 2024-01-17 NOTE — ED PROVIDER NOTE - OBJECTIVE STATEMENT
Patient reports that ever since she had COVID 3 weeks ago, she has been having feelings of confusion and difficulty concentrating.  Has baseline shortness of breath that is unchanged.  No fever, chest pain, abdominal pain, nausea, vomiting, diarrhea, dysuria, urgency, frequency. Symptoms started 12/29/23, tested positive here on 1/5/24.

## 2024-01-17 NOTE — ED PROVIDER NOTE - PHYSICAL EXAMINATION
Gen: Well-developed, well-nourished, NAD, VS as noted by nursing. HEENT: NCAT, mmm   Chest: RRR, nl S1 and S2, no m/r/g. Resp: CTAB, no w/r/r  Abd: nl BS, soft, nt/nd. Ext: Warm, dry. trace pitting edema to b/l lower legs.  Neuro: CN II-XII intact, normal and equal strength, sensation, and reflexes bilaterally, speech clear  Psych: AAOx3

## 2024-01-17 NOTE — ED ADULT TRIAGE NOTE - CHIEF COMPLAINT QUOTE
Pt BIBEMS from home accompanied by grandson complaining of "feeling lost and confused," over the past few days. Pt AAOX3 at this time. Pt with recent covid diagnosis.

## 2024-01-18 LAB
CULTURE RESULTS: SIGNIFICANT CHANGE UP
SPECIMEN SOURCE: SIGNIFICANT CHANGE UP

## 2024-02-05 NOTE — ED ADULT NURSE NOTE - CAS TRG GENERAL AIRWAY, MLM
----- Message from Jolynn Chicas RN sent at 2/5/2024  3:04 PM CST -----  Regarding: FW: Medical Records  Contact: Irene [phone]    ----- Message -----  From: Isabelle Garcia  Sent: 2/5/2024   1:45 PM CST  To: Perlita Wynn Staff  Subject: Medical Records                                  [facility] Medical records is trying to sent medical records over states it is about 45 pages and want to know the medical records can be mailed please call      
Called & spoke to Irene Chapa fax medical records since pt scheduled for appt tomorrow, 2/6/24  
Patent

## 2024-03-14 ENCOUNTER — APPOINTMENT (OUTPATIENT)
Dept: HEART AND VASCULAR | Facility: CLINIC | Age: 89
End: 2024-03-14

## 2024-04-19 PROBLEM — I10 HTN (HYPERTENSION): Status: ACTIVE | Noted: 2023-08-21

## 2024-04-19 PROBLEM — I48.19 ATRIAL FIBRILLATION, PERSISTENT: Status: ACTIVE | Noted: 2023-08-21

## 2024-04-19 PROBLEM — E78.5 DYSLIPIDEMIA: Status: ACTIVE | Noted: 2023-08-21

## 2024-04-19 PROBLEM — I50.32 CHRONIC DIASTOLIC HEART FAILURE: Status: ACTIVE | Noted: 2023-08-21

## 2024-04-19 NOTE — ASSESSMENT
[FreeTextEntry1] : ======================================================================================= 1. Atrial fibrillation, persistent: [id number] Score 5:  - continue systemic anticoagulation with Eliquis 2.5 mg po bid (age > 80, body weight < 60 kg)  - discussed with patient avoidance of ASA and NSAIDS as well as need for bleeding surveillance  - will follow with periodic echocardiograms to assess LV function   2. Chronic diastolic heart failure: NYHA II: s/p acute on chronic exacerbation (12/12/23, 01/05/24):    - continue Farxiga 10mg po qd (discussed importance of urinary hygiene to prevent perineal infections)  - continue furosemide 40mg po qd prn   - will follow with serial echocardiograms   3. HTN: BP at ACC/AHA 2017 guideline target:  - continue irbesartan 150mg po qd  - continue metoprolol succinate 25mg po qd  4. Dyslipidemia: LDL 56 (12/12/23):  - continue atorvastatin 10mg po qd  - discussed therapeutic lifestyle changes to promote improved lipid metabolism  - will check lab work next visit   I spent > 45 minutes of total time on this visit, including time spent face-to-face and non-face-to-face.  During this time, I took a relevant history and examined the patient.  I reviewed relevant portions of the medical record and formulated a differential diagnosis and plan.  I explained the relevant cardiac diagnoses to the patient, as well as the work up and management plan.  I answered all questions related to the patient's cardiac conditions.

## 2024-04-19 NOTE — HISTORY OF PRESENT ILLNESS
[FreeTextEntry1] : Ms. Merino presents for follow up and management of persistent atrial fibrillation, chronic diastolic heart failure, osteoporosis, chronic anemia, colon cancer (10/13/16), HTN, and dyslipidemia.  She was previously followed by another cardiologist, Daksha Nur MD (who moved).  Her  (who was my patient) passed away on 12/24/22 at the age of 89.  She is coping relatively well since his passing.  On 12/12/23, she was scheduled to see me in the office at 2pm but was evaluated in the ED at [facility] for acute on chronic diastolic heart failure. She was admitted to [facility] and treated with IV diuretics.  On 12/13/23, she had an echocardiogram which revealed an EF of > 75%, mild LVH, aortic sclerosis, mild AI, mild MR, and mild-to-moderate TR.  On 01/01/24, she had the onset of viral URI symptoms.  She tested negative for COVID-19 at that time.  On 01/105/24, she was evaluated in the ED at [facility] for acute on chronic diastolic heart failure.  Her BNP NT-proBNP was 4180.  She was treated with IV diuretics and discharged home.

## 2024-04-23 ENCOUNTER — APPOINTMENT (OUTPATIENT)
Dept: HEART AND VASCULAR | Facility: CLINIC | Age: 89
End: 2024-04-23

## 2024-04-23 DIAGNOSIS — I48.19 OTHER PERSISTENT ATRIAL FIBRILLATION: ICD-10-CM

## 2024-04-23 DIAGNOSIS — E78.5 HYPERLIPIDEMIA, UNSPECIFIED: ICD-10-CM

## 2024-04-23 DIAGNOSIS — I50.32 CHRONIC DIASTOLIC (CONGESTIVE) HEART FAILURE: ICD-10-CM

## 2024-04-23 DIAGNOSIS — I10 ESSENTIAL (PRIMARY) HYPERTENSION: ICD-10-CM

## 2024-08-09 ENCOUNTER — RX RENEWAL (OUTPATIENT)
Age: 89
End: 2024-08-09

## 2025-04-29 NOTE — H&P ADULT - NSHPLABSRESULTS_GEN_ALL_CORE
EKG: Afib 66, non ischemic                          13.3   7.16  )-----------( 183      ( 12 Dec 2023 05:11 )             40.6       12-12    140  |  103  |  25<H>  ----------------------------<  106<H>  3.8   |  31  |  0.76    Ca    9.3      12 Dec 2023 05:11    TPro  7.1  /  Alb  3.5  /  TBili  0.8  /  DBili  x   /  AST  31  /  ALT  29  /  AlkPhos  95  12-12      CARDIAC MARKERS ( 12 Dec 2023 21:15 )  x     / x     / 49 U/L / x     / 3.4 ng/mL      Urinalysis Basic - ( 12 Dec 2023 05:11 )    Color: x / Appearance: x / SG: x / pH: x  Gluc: 106 mg/dL / Ketone: x  / Bili: x / Urobili: x   Blood: x / Protein: x / Nitrite: x   Leuk Esterase: x / RBC: x / WBC x   Sq Epi: x / Non Sq Epi: x / Bacteria: x caffeine

## 2025-09-15 ENCOUNTER — RX RENEWAL (OUTPATIENT)
Age: OVER 89
End: 2025-09-15